# Patient Record
Sex: FEMALE | Race: WHITE | Employment: OTHER | URBAN - NONMETROPOLITAN AREA
[De-identification: names, ages, dates, MRNs, and addresses within clinical notes are randomized per-mention and may not be internally consistent; named-entity substitution may affect disease eponyms.]

---

## 2017-09-06 ENCOUNTER — HOSPITAL ENCOUNTER (OUTPATIENT)
Dept: WOMENS IMAGING | Age: 62
Discharge: HOME | End: 2017-09-06
Payer: COMMERCIAL

## 2017-09-06 DIAGNOSIS — Z12.31 VISIT FOR SCREENING MAMMOGRAM: ICD-10-CM

## 2017-09-06 PROCEDURE — 77063 BREAST TOMOSYNTHESIS BI: CPT

## 2019-04-16 ENCOUNTER — HOSPITAL ENCOUNTER (EMERGENCY)
Age: 64
Discharge: HOME | End: 2019-04-17
Payer: COMMERCIAL

## 2019-04-16 ENCOUNTER — APPOINTMENT (OUTPATIENT)
Dept: GENERAL RADIOLOGY | Age: 64
End: 2019-04-16
Payer: COMMERCIAL

## 2019-04-16 DIAGNOSIS — M77.51 BONE SPUR OF RIGHT FOOT: ICD-10-CM

## 2019-04-16 DIAGNOSIS — S93.601A SPRAIN OF RIGHT FOOT, INITIAL ENCOUNTER: Primary | ICD-10-CM

## 2019-04-16 PROCEDURE — 73600 X-RAY EXAM OF ANKLE: CPT

## 2019-04-16 PROCEDURE — 73630 X-RAY EXAM OF FOOT: CPT

## 2019-04-16 PROCEDURE — 99283 EMERGENCY DEPT VISIT LOW MDM: CPT

## 2019-04-16 RX ORDER — ACETAMINOPHEN 325 MG/1
650 TABLET ORAL ONCE
Status: COMPLETED | OUTPATIENT
Start: 2019-04-16 | End: 2019-04-17

## 2019-04-16 ASSESSMENT — ENCOUNTER SYMPTOMS
RHINORRHEA: 0
WHEEZING: 0
VOMITING: 0
BACK PAIN: 0
EYE DISCHARGE: 0
NAUSEA: 0
SORE THROAT: 0
SHORTNESS OF BREATH: 0
COUGH: 0
ABDOMINAL PAIN: 0
EYE PAIN: 0
DIARRHEA: 0

## 2019-04-16 ASSESSMENT — PAIN DESCRIPTION - PAIN TYPE: TYPE: ACUTE PAIN

## 2019-04-16 ASSESSMENT — PAIN DESCRIPTION - LOCATION: LOCATION: FOOT

## 2019-04-16 ASSESSMENT — PAIN DESCRIPTION - DESCRIPTORS: DESCRIPTORS: ACHING

## 2019-04-16 ASSESSMENT — PAIN DESCRIPTION - ORIENTATION: ORIENTATION: RIGHT

## 2019-04-16 ASSESSMENT — PAIN SCALES - GENERAL: PAINLEVEL_OUTOF10: 4

## 2019-04-17 VITALS
SYSTOLIC BLOOD PRESSURE: 143 MMHG | HEIGHT: 61 IN | OXYGEN SATURATION: 98 % | BODY MASS INDEX: 19.45 KG/M2 | RESPIRATION RATE: 14 BRPM | WEIGHT: 103 LBS | TEMPERATURE: 98 F | HEART RATE: 88 BPM | DIASTOLIC BLOOD PRESSURE: 83 MMHG

## 2019-04-17 PROCEDURE — 6370000000 HC RX 637 (ALT 250 FOR IP): Performed by: STUDENT IN AN ORGANIZED HEALTH CARE EDUCATION/TRAINING PROGRAM

## 2019-04-17 RX ADMIN — ACETAMINOPHEN 650 MG: 325 TABLET ORAL at 00:04

## 2019-04-17 NOTE — ED PROVIDER NOTES
history of Chronic kidney disease. SURGICAL HISTORY      has a past surgical history that includes Breast surgery; Tubal ligation (); fracture surgery; eye surgery; Cholecystectomy; bladder suspension (); and Colonoscopy. CURRENT MEDICATIONS       Discharge Medication List as of 2019 12:14 AM      CONTINUE these medications which have NOT CHANGED    Details   alendronate (FOSAMAX) 70 MG tablet Take 70 mg by mouth every 7 daysHistorical Med      Calcium Citrate-Vitamin D (CALCIUM + D PO) Take by mouthHistorical Med      MULTIPLE VITAMIN PO Take  by mouth daily. Historical Med      Omega-3 Fatty Acids (OMEGA 3 PO) Take 1 tablet by mouth daily. Historical Med             ALLERGIES     has No Known Allergies. FAMILY HISTORY     indicated that her mother is alive. She indicated that her father is . family history includes Arrhythmia in her mother; Heart Disease in her father. SOCIAL HISTORY      reports that she has never smoked. She has never used smokeless tobacco. She reports that she does not drink alcohol or use drugs. PHYSICAL EXAM     INITIAL VITALS:  height is 5' 1\" (1.549 m) and weight is 103 lb (46.7 kg). Her oral temperature is 98 °F (36.7 °C). Her blood pressure is 143/83 (abnormal) and her pulse is 88. Her respiration is 14 and oxygen saturation is 98%. Physical Exam   Constitutional: She is oriented to person, place, and time. She appears well-developed and well-nourished. HENT:   Head: Normocephalic and atraumatic. Right Ear: External ear normal.   Left Ear: External ear normal.   Eyes: Conjunctivae are normal. Right eye exhibits no discharge. Left eye exhibits no discharge. No scleral icterus. Neck: Normal range of motion. Neck supple. No JVD present. Cardiovascular: Normal rate and regular rhythm. Pulmonary/Chest: Effort normal. No stridor. No respiratory distress. Abdominal: Soft. She exhibits no distension. Musculoskeletal: Normal range of motion. She exhibits no edema. Right foot: There is tenderness (to the proximal forefoot) and swelling (to the proximal forefoot). Pain to the right foot with lateral squeezing. Neurological: She is alert and oriented to person, place, and time. She exhibits normal muscle tone. GCS eye subscore is 4. GCS verbal subscore is 5. GCS motor subscore is 6. Skin: Skin is warm and dry. She is not diaphoretic. No erythema. Psychiatric: She has a normal mood and affect. Her behavior is normal.   Nursing note and vitals reviewed. DIFFERENTIAL DIAGNOSIS:   Including but not limited to Fracture, Sprain, Dislocation    DIAGNOSTIC RESULTS     EKG: All EKG's are interpreted by the Emergency Department Physician who either signs or Co-signs this chart in the absence of a cardiologist.    RADIOLOGY: non-plain film images(s) such as CT, Ultrasound andMRI are read by the radiologist.  Plain radiographic images are visualized and preliminarily interpreted by the emergency physician unless otherwise stated below. XR FOOT RIGHT (MIN 3 VIEWS)   Final Result    No acute bone or joint abnormality. Talonavicular joint DJD. Tiny plantar calcaneal spur. Hypertrophic spur arising from the lateral sesamoid adjacent to the first metatarsal head. **This report has been created using voice recognition software. It may contain minor errors which are inherent in voice recognition technology. **      Final report electronically signed by Dr. Stephany Varela on 4/17/2019 12:05 AM      XR ANKLE RIGHT (2 VIEWS)   Final Result    No acute fracture or dislocation. Mild anterior and posterior soft tissue swelling. Moderate ankle joint DJD. **This report has been created using voice recognition software. It may contain minor errors which are inherent in voice recognition technology. **      Final report electronically signed by Dr. Stephany Varela on 4/16/2019 10:45 PM          LABS:   Labs Reviewed - No data to display    EMERGENCY DEPARTMENT COURSE:   Vitals:    Vitals:    04/16/19 2142 04/16/19 2143 04/17/19 0004   BP:  (!) 180/88 (!) 143/83   Pulse: 88     Resp: 14     Temp: 98 °F (36.7 °C)     TempSrc: Oral     SpO2: 98%     Weight: 103 lb (46.7 kg)     Height: 5' 1\" (1.549 m)       2144 Xray of ankle ordered    2311 Weight bearing xray ordered. Tylenol given    X-rays reveal calcaneal spur degenerative joint disease at talonavicular joint and hypertrophic spur at the lateral sesamoid adjacent to the first metatarsal head. No acute bone or joint abnormality is found. provided an Ace wrap and has her own crutches. I have given the patient strict written and verbal instructions about care at home, follow-up, and sign and symptoms of worsening of condition and they did verbalize understanding. CRITICAL CARE:   None    CONSULTS:  None    PROCEDURES:  None    FINALIMPRESSION      1. Sprain of right foot, initial encounter    2. Bone spur of right foot          DISPOSITION/PLAN   discharge    PATIENT REFERRED TO:  Rico Restrepo  [id number]  [phone]    Go in 1 week  if symptoms continue, for follow up      DISCHARGE MEDICATIONS:  Discharge Medication List as of 4/17/2019 12:14 AM          (Please note that portions of this notewere completed with a voice recognition program.  Efforts were made to edit the dictations but occasionally words are mis-transcribed.)    Scribe:  Signed by: Nataliia Vyas, 4/16/19 11:11 PM Scribing for and in the presence of Tatianna Vincent PA-C. Provider:  I personally performed the services described in the documentation, reviewed and editedthe documentation which was dictated to the scribe in my presence, and it accurately records my words and actions.     RASHEED ParhamC4/16/19 12:26 AM        CRYSTAL Marks PA-C  04/17/19 [id number]

## 2019-04-17 NOTE — ED TRIAGE NOTES
Pt presents to the ED with c/o right foot pain following a fall at home. Pt reports she was rushing to go outside and get the mail when she tripped and fell forward hitting the top of her right foot. Pt presents to the ED using crutches from home. Some swelling noted to the top of foot.

## 2019-05-03 ENCOUNTER — HOSPITAL ENCOUNTER (OUTPATIENT)
Dept: WOMENS IMAGING | Age: 64
Discharge: HOME | End: 2019-05-03
Payer: MEDICARE

## 2019-05-03 DIAGNOSIS — Z78.0 POST-MENOPAUSAL: ICD-10-CM

## 2019-05-03 DIAGNOSIS — Z12.31 VISIT FOR SCREENING MAMMOGRAM: ICD-10-CM

## 2019-05-03 PROCEDURE — 77063 BREAST TOMOSYNTHESIS BI: CPT

## 2019-05-03 PROCEDURE — 77080 DXA BONE DENSITY AXIAL: CPT

## 2019-09-03 LAB
BILIRUBIN URINE: ABNORMAL MG/DL
BLOOD, URINE: ABNORMAL
CLARITY: ABNORMAL
COLOR: YELLOW
GLUCOSE URINE: NEGATIVE
KETONES, URINE: NEGATIVE
LEUKOCYTE ESTERASE, URINE: ABNORMAL
NITRITE, URINE: NEGATIVE
PH UA: 7 (ref 4.5–8)
PROTEIN UA: NEGATIVE
SPECIFIC GRAVITY UA: 1.01 (ref 1–1.03)
UROBILINOGEN, URINE: NORMAL

## 2019-09-23 LAB
BILIRUBIN URINE: NORMAL MG/DL
BLOOD, URINE: NORMAL
CLARITY: CLEAR
COLOR: YELLOW
GLUCOSE URINE: NEGATIVE
KETONES, URINE: NEGATIVE
LEUKOCYTE ESTERASE, URINE: NORMAL
NITRITE, URINE: NEGATIVE
PH UA: 6 (ref 4.5–8)
PROTEIN UA: NEGATIVE
SPECIFIC GRAVITY UA: 1.02 (ref 1–1.03)
UROBILINOGEN, URINE: NORMAL

## 2019-10-03 LAB
BILIRUBIN URINE: NORMAL MG/DL
BLOOD, URINE: NORMAL
CLARITY: CLEAR
COLOR: YELLOW
GLUCOSE URINE: NEGATIVE
KETONES, URINE: NEGATIVE
LEUKOCYTE ESTERASE, URINE: NORMAL
NITRITE, URINE: NEGATIVE
PH UA: 6.5 (ref 4.5–8)
PROTEIN UA: NEGATIVE
SPECIFIC GRAVITY UA: 1.01 (ref 1–1.03)
UROBILINOGEN, URINE: NORMAL

## 2019-10-17 LAB
BILIRUBIN URINE: NORMAL MG/DL
BLOOD, URINE: NORMAL
CLARITY: CLEAR
COLOR: YELLOW
GLUCOSE URINE: NEGATIVE
KETONES, URINE: NEGATIVE
LEUKOCYTE ESTERASE, URINE: NEGATIVE
NITRITE, URINE: NEGATIVE
PH UA: 6.5 (ref 4.5–8)
PROTEIN UA: NEGATIVE
SPECIFIC GRAVITY UA: 1.02 (ref 1–1.03)
UROBILINOGEN, URINE: NORMAL

## 2019-10-29 ENCOUNTER — HOSPITAL ENCOUNTER (OUTPATIENT)
Dept: CT IMAGING | Age: 64
Discharge: HOME | End: 2019-10-29
Payer: MEDICARE

## 2019-10-29 DIAGNOSIS — R31.21 ASYMPTOMATIC MICROSCOPIC HEMATURIA: ICD-10-CM

## 2019-10-29 LAB — POC CREATININE WHOLE BLOOD: 0.8 MG/DL (ref 0.5–1.2)

## 2019-10-29 PROCEDURE — 6360000004 HC RX CONTRAST MEDICATION: Performed by: FAMILY MEDICINE

## 2019-10-29 PROCEDURE — 74178 CT ABD&PLV WO CNTR FLWD CNTR: CPT

## 2019-10-29 PROCEDURE — 82565 ASSAY OF CREATININE: CPT

## 2019-10-29 RX ADMIN — IOPAMIDOL 80 ML: 755 INJECTION, SOLUTION INTRAVENOUS at 16:34

## 2019-11-04 ENCOUNTER — OFFICE VISIT (OUTPATIENT)
Dept: UROLOGY | Age: 64
End: 2019-11-04
Payer: MEDICARE

## 2019-11-04 VITALS
HEIGHT: 60 IN | SYSTOLIC BLOOD PRESSURE: 120 MMHG | DIASTOLIC BLOOD PRESSURE: 78 MMHG | WEIGHT: 103.8 LBS | BODY MASS INDEX: 20.38 KG/M2

## 2019-11-04 DIAGNOSIS — R31.9 HEMATURIA, UNSPECIFIED TYPE: Primary | ICD-10-CM

## 2019-11-04 LAB
BILIRUBIN URINE: NEGATIVE
BLOOD URINE, POC: ABNORMAL
CHARACTER, URINE: CLEAR
COLOR, URINE: YELLOW
GLUCOSE URINE: NEGATIVE MG/DL
KETONES, URINE: NEGATIVE
LEUKOCYTE CLUMPS, URINE: ABNORMAL
NITRITE, URINE: NEGATIVE
PH, URINE: 5.5 (ref 5–9)
PROTEIN, URINE: NEGATIVE MG/DL
SPECIFIC GRAVITY, URINE: 1.02 (ref 1–1.03)
UROBILINOGEN, URINE: 0.2 EU/DL (ref 0–1)

## 2019-11-04 PROCEDURE — 81003 URINALYSIS AUTO W/O SCOPE: CPT | Performed by: UROLOGY

## 2019-11-04 PROCEDURE — 99204 OFFICE O/P NEW MOD 45 MIN: CPT | Performed by: UROLOGY

## 2019-11-11 ENCOUNTER — TELEPHONE (OUTPATIENT)
Dept: UROLOGY | Age: 64
End: 2019-11-11

## 2019-11-11 ENCOUNTER — PROCEDURE VISIT (OUTPATIENT)
Dept: UROLOGY | Age: 64
End: 2019-11-11
Payer: MEDICARE

## 2019-11-11 VITALS
BODY MASS INDEX: 20.62 KG/M2 | WEIGHT: 105 LBS | DIASTOLIC BLOOD PRESSURE: 80 MMHG | SYSTOLIC BLOOD PRESSURE: 110 MMHG | HEIGHT: 60 IN

## 2019-11-11 DIAGNOSIS — Z01.818 PRE-OP TESTING: ICD-10-CM

## 2019-11-11 DIAGNOSIS — R31.9 HEMATURIA, UNSPECIFIED TYPE: Primary | ICD-10-CM

## 2019-11-11 LAB
BILIRUBIN URINE: NEGATIVE
BLOOD URINE, POC: ABNORMAL
CHARACTER, URINE: CLEAR
COLOR, URINE: YELLOW
GLUCOSE URINE: NEGATIVE MG/DL
KETONES, URINE: NEGATIVE
LEUKOCYTE CLUMPS, URINE: NEGATIVE
NITRITE, URINE: NEGATIVE
PH, URINE: 5.5 (ref 5–9)
PROTEIN, URINE: NEGATIVE MG/DL
SPECIFIC GRAVITY, URINE: 1.02 (ref 1–1.03)
UROBILINOGEN, URINE: 0.2 EU/DL (ref 0–1)

## 2019-11-11 PROCEDURE — 52000 CYSTOURETHROSCOPY: CPT | Performed by: UROLOGY

## 2019-11-11 PROCEDURE — 81003 URINALYSIS AUTO W/O SCOPE: CPT | Performed by: UROLOGY

## 2019-11-11 PROCEDURE — 99214 OFFICE O/P EST MOD 30 MIN: CPT | Performed by: UROLOGY

## 2019-11-13 ENCOUNTER — PREP FOR PROCEDURE (OUTPATIENT)
Dept: UROLOGY | Age: 64
End: 2019-11-13

## 2019-11-13 ENCOUNTER — TELEPHONE (OUTPATIENT)
Dept: UROLOGY | Age: 64
End: 2019-11-13

## 2019-11-13 ENCOUNTER — OFFICE VISIT (OUTPATIENT)
Dept: RHEUMATOLOGY | Age: 64
End: 2019-11-13
Payer: MEDICARE

## 2019-11-13 VITALS
BODY MASS INDEX: 20.6 KG/M2 | OXYGEN SATURATION: 96 % | DIASTOLIC BLOOD PRESSURE: 60 MMHG | HEIGHT: 60 IN | WEIGHT: 104.94 LBS | HEART RATE: 73 BPM | SYSTOLIC BLOOD PRESSURE: 80 MMHG

## 2019-11-13 DIAGNOSIS — M81.0 AGE-RELATED OSTEOPOROSIS WITHOUT CURRENT PATHOLOGICAL FRACTURE: Primary | ICD-10-CM

## 2019-11-13 DIAGNOSIS — R53.83 OTHER FATIGUE: ICD-10-CM

## 2019-11-13 PROCEDURE — 99204 OFFICE O/P NEW MOD 45 MIN: CPT | Performed by: NURSE PRACTITIONER

## 2019-11-13 RX ORDER — SODIUM CHLORIDE 9 MG/ML
INJECTION, SOLUTION INTRAVENOUS CONTINUOUS
Status: CANCELLED | OUTPATIENT
Start: 2019-12-02

## 2019-11-13 ASSESSMENT — ENCOUNTER SYMPTOMS
EYE PAIN: 0
CONSTIPATION: 0
COUGH: 0
EYE REDNESS: 0
DIARRHEA: 0
TROUBLE SWALLOWING: 0
BACK PAIN: 0
SHORTNESS OF BREATH: 0
VOMITING: 0

## 2019-11-19 ENCOUNTER — HOSPITAL ENCOUNTER (OUTPATIENT)
Dept: PHYSICAL THERAPY | Age: 64
Setting detail: THERAPIES SERIES
Discharge: HOME | End: 2019-11-19
Payer: MEDICARE

## 2019-11-19 PROCEDURE — 97110 THERAPEUTIC EXERCISES: CPT

## 2019-11-19 PROCEDURE — 97530 THERAPEUTIC ACTIVITIES: CPT

## 2019-11-19 PROCEDURE — 97161 PT EVAL LOW COMPLEX 20 MIN: CPT

## 2019-11-19 ASSESSMENT — PAIN DESCRIPTION - PAIN TYPE: TYPE: CHRONIC PAIN

## 2019-11-19 ASSESSMENT — PAIN SCALES - GENERAL: PAINLEVEL_OUTOF10: 2

## 2019-11-19 ASSESSMENT — PAIN DESCRIPTION - LOCATION: LOCATION: BACK

## 2019-11-20 ENCOUNTER — HOSPITAL ENCOUNTER (OUTPATIENT)
Age: 64
Discharge: HOME | End: 2019-11-20
Payer: MEDICARE

## 2019-11-20 ENCOUNTER — HOSPITAL ENCOUNTER (OUTPATIENT)
Dept: GENERAL RADIOLOGY | Age: 64
Discharge: HOME | End: 2019-11-20
Payer: MEDICARE

## 2019-11-20 DIAGNOSIS — Z01.818 PRE-OP TESTING: ICD-10-CM

## 2019-11-20 DIAGNOSIS — M81.0 AGE-RELATED OSTEOPOROSIS WITHOUT CURRENT PATHOLOGICAL FRACTURE: ICD-10-CM

## 2019-11-20 DIAGNOSIS — R53.83 OTHER FATIGUE: ICD-10-CM

## 2019-11-20 DIAGNOSIS — R31.9 HEMATURIA, UNSPECIFIED TYPE: ICD-10-CM

## 2019-11-20 LAB
ALBUMIN SERPL-MCNC: 4.4 G/DL (ref 3.5–5.1)
ALP BLD-CCNC: 62 U/L (ref 38–126)
ALT SERPL-CCNC: 12 U/L (ref 11–66)
ANION GAP SERPL CALCULATED.3IONS-SCNC: 11 MEQ/L (ref 8–16)
AST SERPL-CCNC: 21 U/L (ref 5–40)
BASOPHILS # BLD: 0.9 %
BASOPHILS ABSOLUTE: 0 THOU/MM3 (ref 0–0.1)
BILIRUB SERPL-MCNC: 0.6 MG/DL (ref 0.3–1.2)
BUN BLDV-MCNC: 19 MG/DL (ref 7–22)
C-REACTIVE PROTEIN: 0.1 MG/DL (ref 0–1)
CALCIUM SERPL-MCNC: 9.6 MG/DL (ref 8.5–10.5)
CHLORIDE BLD-SCNC: 100 MEQ/L (ref 98–111)
CO2: 28 MEQ/L (ref 23–33)
CREAT SERPL-MCNC: 0.7 MG/DL (ref 0.4–1.2)
EKG ATRIAL RATE: 57 BPM
EKG P AXIS: 43 DEGREES
EKG P-R INTERVAL: 132 MS
EKG Q-T INTERVAL: 422 MS
EKG QRS DURATION: 78 MS
EKG QTC CALCULATION (BAZETT): 410 MS
EKG R AXIS: 45 DEGREES
EKG T AXIS: 45 DEGREES
EKG VENTRICULAR RATE: 57 BPM
EOSINOPHIL # BLD: 6 %
EOSINOPHILS ABSOLUTE: 0.3 THOU/MM3 (ref 0–0.4)
ERYTHROCYTE [DISTWIDTH] IN BLOOD BY AUTOMATED COUNT: 13.1 % (ref 11.5–14.5)
ERYTHROCYTE [DISTWIDTH] IN BLOOD BY AUTOMATED COUNT: 44.5 FL (ref 35–45)
GFR SERPL CREATININE-BSD FRML MDRD: 84 ML/MIN/1.73M2
GLUCOSE BLD-MCNC: 91 MG/DL (ref 70–108)
HCT VFR BLD CALC: 42.6 % (ref 37–47)
HEMOGLOBIN: 14.2 GM/DL (ref 12–16)
IMMATURE GRANS (ABS): 0.01 THOU/MM3 (ref 0–0.07)
IMMATURE GRANULOCYTES: 0.2 %
LYMPHOCYTES # BLD: 39.7 %
LYMPHOCYTES ABSOLUTE: 1.8 THOU/MM3 (ref 1–4.8)
MAGNESIUM: 1.9 MG/DL (ref 1.6–2.4)
MCH RBC QN AUTO: 30.9 PG (ref 26–33)
MCHC RBC AUTO-ENTMCNC: 33.3 GM/DL (ref 32.2–35.5)
MCV RBC AUTO: 92.6 FL (ref 81–99)
MONOCYTES # BLD: 9.7 %
MONOCYTES ABSOLUTE: 0.4 THOU/MM3 (ref 0.4–1.3)
NUCLEATED RED BLOOD CELLS: 0 /100 WBC
PHOSPHORUS: 3.7 MG/DL (ref 2.4–4.7)
PLATELET # BLD: 277 THOU/MM3 (ref 130–400)
PMV BLD AUTO: 10.7 FL (ref 9.4–12.4)
POTASSIUM SERPL-SCNC: 4.8 MEQ/L (ref 3.5–5.2)
RBC # BLD: 4.6 MILL/MM3 (ref 4.2–5.4)
SEDIMENTATION RATE, ERYTHROCYTE: 20 MM/HR (ref 0–20)
SEG NEUTROPHILS: 43.5 %
SEGMENTED NEUTROPHILS ABSOLUTE COUNT: 2 THOU/MM3 (ref 1.8–7.7)
SODIUM BLD-SCNC: 139 MEQ/L (ref 135–145)
T4 FREE: 1.18 NG/DL (ref 0.93–1.76)
TOTAL PROTEIN: 7.3 G/DL (ref 6.1–8)
TSH SERPL DL<=0.05 MIU/L-ACNC: 5.46 UIU/ML (ref 0.4–4.2)
VITAMIN D 25-HYDROXY: 38 NG/ML (ref 30–100)
WBC # BLD: 4.6 THOU/MM3 (ref 4.8–10.8)

## 2019-11-20 PROCEDURE — 36415 COLL VENOUS BLD VENIPUNCTURE: CPT

## 2019-11-20 PROCEDURE — 93005 ELECTROCARDIOGRAM TRACING: CPT

## 2019-11-20 PROCEDURE — 84100 ASSAY OF PHOSPHORUS: CPT

## 2019-11-20 PROCEDURE — 84443 ASSAY THYROID STIM HORMONE: CPT

## 2019-11-20 PROCEDURE — 85025 COMPLETE CBC W/AUTO DIFF WBC: CPT

## 2019-11-20 PROCEDURE — 85651 RBC SED RATE NONAUTOMATED: CPT

## 2019-11-20 PROCEDURE — 83735 ASSAY OF MAGNESIUM: CPT

## 2019-11-20 PROCEDURE — 86140 C-REACTIVE PROTEIN: CPT

## 2019-11-20 PROCEDURE — 71046 X-RAY EXAM CHEST 2 VIEWS: CPT

## 2019-11-20 PROCEDURE — 82306 VITAMIN D 25 HYDROXY: CPT

## 2019-11-20 PROCEDURE — 84439 ASSAY OF FREE THYROXINE: CPT

## 2019-11-20 PROCEDURE — 80053 COMPREHEN METABOLIC PANEL: CPT

## 2019-11-27 DIAGNOSIS — M81.0 AGE-RELATED OSTEOPOROSIS WITHOUT CURRENT PATHOLOGICAL FRACTURE: ICD-10-CM

## 2019-11-27 RX ORDER — SODIUM CHLORIDE 9 MG/ML
INJECTION, SOLUTION INTRAVENOUS CONTINUOUS
Status: CANCELLED | OUTPATIENT
Start: 2019-11-27

## 2019-11-27 RX ORDER — EPINEPHRINE 1 MG/ML
0.3 INJECTION, SOLUTION, CONCENTRATE INTRAVENOUS PRN
Status: CANCELLED | OUTPATIENT
Start: 2019-11-27

## 2019-11-27 RX ORDER — METHYLPREDNISOLONE SODIUM SUCCINATE 125 MG/2ML
125 INJECTION, POWDER, LYOPHILIZED, FOR SOLUTION INTRAMUSCULAR; INTRAVENOUS ONCE
Status: CANCELLED | OUTPATIENT
Start: 2019-11-27

## 2019-11-27 RX ORDER — DIPHENHYDRAMINE HYDROCHLORIDE 50 MG/ML
50 INJECTION INTRAMUSCULAR; INTRAVENOUS ONCE
Status: CANCELLED | OUTPATIENT
Start: 2019-11-27

## 2019-12-02 ENCOUNTER — ANESTHESIA (OUTPATIENT)
Dept: OPERATING ROOM | Age: 64
End: 2019-12-02
Payer: MEDICARE

## 2019-12-02 ENCOUNTER — ANESTHESIA EVENT (OUTPATIENT)
Dept: OPERATING ROOM | Age: 64
End: 2019-12-02
Payer: MEDICARE

## 2019-12-02 ENCOUNTER — HOSPITAL ENCOUNTER (OUTPATIENT)
Age: 64
Setting detail: OUTPATIENT SURGERY
Discharge: HOME | End: 2019-12-02
Attending: UROLOGY | Admitting: UROLOGY
Payer: MEDICARE

## 2019-12-02 ENCOUNTER — TELEPHONE (OUTPATIENT)
Dept: UROLOGY | Age: 64
End: 2019-12-02

## 2019-12-02 VITALS — OXYGEN SATURATION: 97 % | DIASTOLIC BLOOD PRESSURE: 59 MMHG | SYSTOLIC BLOOD PRESSURE: 125 MMHG

## 2019-12-02 VITALS
TEMPERATURE: 98 F | HEIGHT: 60 IN | BODY MASS INDEX: 20.38 KG/M2 | HEART RATE: 66 BPM | WEIGHT: 103.8 LBS | SYSTOLIC BLOOD PRESSURE: 131 MMHG | OXYGEN SATURATION: 100 % | DIASTOLIC BLOOD PRESSURE: 63 MMHG | RESPIRATION RATE: 16 BRPM

## 2019-12-02 PROCEDURE — 2580000003 HC RX 258

## 2019-12-02 PROCEDURE — 3700000000 HC ANESTHESIA ATTENDED CARE: Performed by: UROLOGY

## 2019-12-02 PROCEDURE — 3700000001 HC ADD 15 MINUTES (ANESTHESIA): Performed by: UROLOGY

## 2019-12-02 PROCEDURE — 7100000011 HC PHASE II RECOVERY - ADDTL 15 MIN: Performed by: UROLOGY

## 2019-12-02 PROCEDURE — 3600000013 HC SURGERY LEVEL 3 ADDTL 15MIN: Performed by: UROLOGY

## 2019-12-02 PROCEDURE — 6360000002 HC RX W HCPCS

## 2019-12-02 PROCEDURE — 6360000002 HC RX W HCPCS: Performed by: NURSE ANESTHETIST, CERTIFIED REGISTERED

## 2019-12-02 PROCEDURE — 7100000010 HC PHASE II RECOVERY - FIRST 15 MIN: Performed by: UROLOGY

## 2019-12-02 PROCEDURE — 88305 TISSUE EXAM BY PATHOLOGIST: CPT

## 2019-12-02 PROCEDURE — 2709999900 HC NON-CHARGEABLE SUPPLY: Performed by: UROLOGY

## 2019-12-02 PROCEDURE — 3600000003 HC SURGERY LEVEL 3 BASE: Performed by: UROLOGY

## 2019-12-02 RX ORDER — FENTANYL CITRATE 50 UG/ML
INJECTION, SOLUTION INTRAMUSCULAR; INTRAVENOUS PRN
Status: DISCONTINUED | OUTPATIENT
Start: 2019-12-02 | End: 2019-12-02 | Stop reason: SDUPTHER

## 2019-12-02 RX ORDER — PROPOFOL 10 MG/ML
INJECTION, EMULSION INTRAVENOUS PRN
Status: DISCONTINUED | OUTPATIENT
Start: 2019-12-02 | End: 2019-12-02 | Stop reason: SDUPTHER

## 2019-12-02 RX ORDER — FENTANYL CITRATE 50 UG/ML
50 INJECTION, SOLUTION INTRAMUSCULAR; INTRAVENOUS EVERY 5 MIN PRN
Status: DISCONTINUED | OUTPATIENT
Start: 2019-12-02 | End: 2019-12-02 | Stop reason: HOSPADM

## 2019-12-02 RX ORDER — LABETALOL 20 MG/4 ML (5 MG/ML) INTRAVENOUS SYRINGE
10 EVERY 10 MIN PRN
Status: DISCONTINUED | OUTPATIENT
Start: 2019-12-02 | End: 2019-12-02 | Stop reason: HOSPADM

## 2019-12-02 RX ORDER — MORPHINE SULFATE 2 MG/ML
2 INJECTION, SOLUTION INTRAMUSCULAR; INTRAVENOUS EVERY 5 MIN PRN
Status: DISCONTINUED | OUTPATIENT
Start: 2019-12-02 | End: 2019-12-02 | Stop reason: HOSPADM

## 2019-12-02 RX ORDER — SODIUM CHLORIDE 9 MG/ML
INJECTION, SOLUTION INTRAVENOUS CONTINUOUS
Status: DISCONTINUED | OUTPATIENT
Start: 2019-12-02 | End: 2019-12-02 | Stop reason: HOSPADM

## 2019-12-02 RX ADMIN — PROPOFOL 10 MG: 10 INJECTION, EMULSION INTRAVENOUS at 15:15

## 2019-12-02 RX ADMIN — PROPOFOL 20 MG: 10 INJECTION, EMULSION INTRAVENOUS at 15:17

## 2019-12-02 RX ADMIN — FENTANYL CITRATE 50 MCG: 50 INJECTION INTRAMUSCULAR; INTRAVENOUS at 15:23

## 2019-12-02 RX ADMIN — Medication 2 G: at 15:01

## 2019-12-02 RX ADMIN — PROPOFOL 30 MG: 10 INJECTION, EMULSION INTRAVENOUS at 15:05

## 2019-12-02 RX ADMIN — PROPOFOL 50 MG: 10 INJECTION, EMULSION INTRAVENOUS at 15:01

## 2019-12-02 RX ADMIN — FENTANYL CITRATE 50 MCG: 50 INJECTION INTRAMUSCULAR; INTRAVENOUS at 15:00

## 2019-12-02 RX ADMIN — SODIUM CHLORIDE: 9 INJECTION, SOLUTION INTRAVENOUS at 12:56

## 2019-12-02 RX ADMIN — PROPOFOL 20 MG: 10 INJECTION, EMULSION INTRAVENOUS at 15:10

## 2019-12-02 ASSESSMENT — PULMONARY FUNCTION TESTS
PIF_VALUE: 1
PIF_VALUE: 0
PIF_VALUE: 1
PIF_VALUE: 0
PIF_VALUE: 2
PIF_VALUE: 0

## 2019-12-02 ASSESSMENT — PAIN SCALES - GENERAL
PAINLEVEL_OUTOF10: 0

## 2019-12-02 ASSESSMENT — PAIN - FUNCTIONAL ASSESSMENT: PAIN_FUNCTIONAL_ASSESSMENT: 0-10

## 2019-12-03 ENCOUNTER — HOSPITAL ENCOUNTER (OUTPATIENT)
Dept: PHYSICAL THERAPY | Age: 64
Setting detail: THERAPIES SERIES
End: 2019-12-03
Payer: MEDICARE

## 2019-12-09 ENCOUNTER — HOSPITAL ENCOUNTER (OUTPATIENT)
Dept: NURSING | Age: 64
Discharge: HOME | End: 2019-12-09
Payer: MEDICARE

## 2019-12-09 VITALS
RESPIRATION RATE: 16 BRPM | WEIGHT: 105 LBS | HEART RATE: 60 BPM | TEMPERATURE: 97.9 F | BODY MASS INDEX: 20.62 KG/M2 | DIASTOLIC BLOOD PRESSURE: 60 MMHG | OXYGEN SATURATION: 99 % | HEIGHT: 60 IN | SYSTOLIC BLOOD PRESSURE: 126 MMHG

## 2019-12-09 DIAGNOSIS — M81.0 AGE-RELATED OSTEOPOROSIS WITHOUT CURRENT PATHOLOGICAL FRACTURE: Primary | ICD-10-CM

## 2019-12-09 PROCEDURE — 96372 THER/PROPH/DIAG INJ SC/IM: CPT

## 2019-12-09 PROCEDURE — 6360000002 HC RX W HCPCS: Performed by: NURSE PRACTITIONER

## 2019-12-09 RX ORDER — METHYLPREDNISOLONE SODIUM SUCCINATE 125 MG/2ML
125 INJECTION, POWDER, LYOPHILIZED, FOR SOLUTION INTRAMUSCULAR; INTRAVENOUS ONCE
Status: CANCELLED | OUTPATIENT
Start: 2020-06-08

## 2019-12-09 RX ORDER — SODIUM CHLORIDE 9 MG/ML
INJECTION, SOLUTION INTRAVENOUS CONTINUOUS
Status: CANCELLED | OUTPATIENT
Start: 2020-06-08

## 2019-12-09 RX ORDER — DIPHENHYDRAMINE HYDROCHLORIDE 50 MG/ML
50 INJECTION INTRAMUSCULAR; INTRAVENOUS ONCE
Status: CANCELLED | OUTPATIENT
Start: 2020-06-08

## 2019-12-09 RX ADMIN — DENOSUMAB 60 MG: 60 INJECTION SUBCUTANEOUS at 10:42

## 2019-12-09 NOTE — PROGRESS NOTES
__m__ Safety:       (Environmental)   [place] to environment   Ensure ID band is correct and in place/ allergy band as needed   Assess for fall risk   Initiate fall precautions as applicable (fall band, side rails, etc.)   Call light within reach   Bed in low position/ wheels locked

## 2019-12-10 ENCOUNTER — HOSPITAL ENCOUNTER (OUTPATIENT)
Dept: PHYSICAL THERAPY | Age: 64
Setting detail: THERAPIES SERIES
Discharge: HOME | End: 2019-12-10
Payer: MEDICARE

## 2019-12-10 PROCEDURE — 97110 THERAPEUTIC EXERCISES: CPT

## 2019-12-10 ASSESSMENT — PAIN SCALES - GENERAL: PAINLEVEL_OUTOF10: 0

## 2019-12-16 ENCOUNTER — OFFICE VISIT (OUTPATIENT)
Dept: UROLOGY | Age: 64
End: 2019-12-16
Payer: MEDICARE

## 2019-12-16 VITALS
WEIGHT: 105.9 LBS | BODY MASS INDEX: 20.79 KG/M2 | SYSTOLIC BLOOD PRESSURE: 102 MMHG | HEIGHT: 60 IN | DIASTOLIC BLOOD PRESSURE: 60 MMHG

## 2019-12-16 DIAGNOSIS — R31.21 ASYMPTOMATIC MICROSCOPIC HEMATURIA: ICD-10-CM

## 2019-12-16 DIAGNOSIS — N32.9 LESION OF BLADDER: Primary | ICD-10-CM

## 2019-12-16 PROCEDURE — 99213 OFFICE O/P EST LOW 20 MIN: CPT | Performed by: UROLOGY

## 2019-12-16 PROCEDURE — 81003 URINALYSIS AUTO W/O SCOPE: CPT | Performed by: UROLOGY

## 2019-12-17 ENCOUNTER — HOSPITAL ENCOUNTER (OUTPATIENT)
Dept: PHYSICAL THERAPY | Age: 64
Setting detail: THERAPIES SERIES
Discharge: HOME | End: 2019-12-17
Payer: MEDICARE

## 2019-12-17 PROCEDURE — 97110 THERAPEUTIC EXERCISES: CPT

## 2019-12-17 ASSESSMENT — PAIN SCALES - GENERAL: PAINLEVEL_OUTOF10: 0

## 2019-12-23 ENCOUNTER — HOSPITAL ENCOUNTER (OUTPATIENT)
Dept: PHYSICAL THERAPY | Age: 64
Setting detail: THERAPIES SERIES
Discharge: HOME | End: 2019-12-23
Payer: MEDICARE

## 2019-12-23 PROCEDURE — 97110 THERAPEUTIC EXERCISES: CPT

## 2020-01-11 ENCOUNTER — TELEPHONE (OUTPATIENT)
Dept: UROLOGY | Age: 65
End: 2020-01-11

## 2020-01-11 NOTE — TELEPHONE ENCOUNTER
Patient to have repeat CBC with differential obtained due to mildly low white blood cell count on November 2019 labs.

## 2020-01-13 NOTE — TELEPHONE ENCOUNTER
The patient was advised to have the labs repeated. She is in [place] and will return the call to the office with a name of a  lab and fax number of her choice.

## 2020-01-15 LAB
BASOPHILS ABSOLUTE: NORMAL
BASOPHILS RELATIVE PERCENT: 0.6 %
EOSINOPHILS ABSOLUTE: 0.1 /ΜL
EOSINOPHILS RELATIVE PERCENT: 1.9 %
HCT VFR BLD CALC: 40.6 % (ref 36–46)
HEMOGLOBIN: 13.8 G/DL (ref 12–16)
LYMPHOCYTES ABSOLUTE: 1.4 /ΜL
LYMPHOCYTES RELATIVE PERCENT: 27.9 %
MCH RBC QN AUTO: 31.7 PG
MCHC RBC AUTO-ENTMCNC: 33.9 G/DL
MCV RBC AUTO: 93.6 FL
MONOCYTES ABSOLUTE: 0.3 /ΜL
MONOCYTES RELATIVE PERCENT: 5.2 %
NEUTROPHILS ABSOLUTE: 3.2 /ΜL
NEUTROPHILS RELATIVE PERCENT: 64.4 %
PDW BLD-RTO: 13.2 %
PLATELET # BLD: 251 K/ΜL
PMV BLD AUTO: 9.6 FL
RBC # BLD: 4.34 10^6/ΜL
WBC # BLD: 4.9 10^3/ML

## 2020-01-15 NOTE — TELEPHONE ENCOUNTER
Patient returned call and stated to fax order to [facility] [phone]. Order faxed.

## 2020-01-15 NOTE — TELEPHONE ENCOUNTER
Attempted to call the patient to get information on the lab of her choice or a mailing address where she is staying in [place]. Voicemail left to return the call to the office.

## 2020-05-13 ENCOUNTER — VIRTUAL VISIT (OUTPATIENT)
Dept: RHEUMATOLOGY | Age: 65
End: 2020-05-13
Payer: MEDICARE

## 2020-05-13 PROCEDURE — 99213 OFFICE O/P EST LOW 20 MIN: CPT | Performed by: NURSE PRACTITIONER

## 2020-05-13 ASSESSMENT — ENCOUNTER SYMPTOMS
COUGH: 0
BACK PAIN: 0
EYE PAIN: 0
SHORTNESS OF BREATH: 0
DIARRHEA: 0
EYE REDNESS: 0
CONSTIPATION: 0
TROUBLE SWALLOWING: 0
VOMITING: 0

## 2020-06-10 ENCOUNTER — HOSPITAL ENCOUNTER (OUTPATIENT)
Dept: NURSING | Age: 65
Discharge: HOME | End: 2020-06-10
Payer: MEDICARE

## 2020-06-10 VITALS
RESPIRATION RATE: 18 BRPM | SYSTOLIC BLOOD PRESSURE: 122 MMHG | DIASTOLIC BLOOD PRESSURE: 59 MMHG | HEART RATE: 61 BPM | TEMPERATURE: 97 F

## 2020-06-10 DIAGNOSIS — M81.0 AGE-RELATED OSTEOPOROSIS WITHOUT CURRENT PATHOLOGICAL FRACTURE: Primary | ICD-10-CM

## 2020-06-10 PROCEDURE — 6360000002 HC RX W HCPCS: Performed by: NURSE PRACTITIONER

## 2020-06-10 PROCEDURE — 96372 THER/PROPH/DIAG INJ SC/IM: CPT

## 2020-06-10 RX ORDER — SODIUM CHLORIDE 9 MG/ML
INJECTION, SOLUTION INTRAVENOUS CONTINUOUS
Status: CANCELLED | OUTPATIENT
Start: 2020-12-09

## 2020-06-10 RX ORDER — DIPHENHYDRAMINE HYDROCHLORIDE 50 MG/ML
50 INJECTION INTRAMUSCULAR; INTRAVENOUS ONCE
Status: CANCELLED | OUTPATIENT
Start: 2020-12-09

## 2020-06-10 RX ORDER — METHYLPREDNISOLONE SODIUM SUCCINATE 125 MG/2ML
125 INJECTION, POWDER, LYOPHILIZED, FOR SOLUTION INTRAMUSCULAR; INTRAVENOUS ONCE
Status: CANCELLED | OUTPATIENT
Start: 2020-12-09

## 2020-06-10 RX ADMIN — DENOSUMAB 60 MG: 60 INJECTION SUBCUTANEOUS at 11:02

## 2020-06-10 ASSESSMENT — PAIN - FUNCTIONAL ASSESSMENT: PAIN_FUNCTIONAL_ASSESSMENT: 0-10

## 2020-06-10 NOTE — PROGRESS NOTES
1100:  ARRIVES AMBULATORY FOR PROLIA INJECTION. PROCESS REVIEWED AND PT RIGHTS AND RESPONSIBILITIES OFFERED TO PT.  1110:  TOLERATED INJECTION WELL AND PT DISCHARGED AMBULATORY WITH INSTRUCTIONS.   PT IS AWARE SHE NEEDS NEW ORDER.      _M___ Safety:       (Environmental)   [place] to environment   Ensure ID band is correct and in place/ allergy band as needed   Assess for fall risk   Initiate fall precautions as applicable (fall band, side rails, etc.)   Call light within reach   Bed in low position/ wheels locked    _M__ Pain:        Assess pain level and characteristics   Administer analgesics as ordered   Assess effectiveness of pain management and report to MD as needed    _M___ Knowledge Deficit:   Assess baseline knowledge   Provide teaching at level of understanding   Provide teaching via preferred learning method   Evaluate teaching effectiveness    __M__ Hemodynamic/Respiratory Status:       (Pre and Post Procedure Monitoring)   Assess/Monitor vital signs and LOC   Assess Baseline SpO2 prior to any sedation   Obtain weight/height   Assess vital signs/ LOC until patient meets discharge criteria   Monitor procedure site and notify MD of any issues

## 2020-06-22 ENCOUNTER — OFFICE VISIT (OUTPATIENT)
Dept: UROLOGY | Age: 65
End: 2020-06-22
Payer: MEDICARE

## 2020-06-22 VITALS — WEIGHT: 104 LBS | HEIGHT: 60 IN | TEMPERATURE: 97.7 F | BODY MASS INDEX: 20.42 KG/M2

## 2020-06-22 LAB
BILIRUBIN URINE: NEGATIVE
BLOOD URINE, POC: ABNORMAL
CHARACTER, URINE: CLEAR
COLOR, URINE: YELLOW
GLUCOSE URINE: NEGATIVE MG/DL
KETONES, URINE: NEGATIVE
LEUKOCYTE CLUMPS, URINE: NEGATIVE
NITRITE, URINE: NEGATIVE
PH, URINE: 5.5 (ref 5–9)
PROTEIN, URINE: NEGATIVE MG/DL
SPECIFIC GRAVITY, URINE: >= 1.03 (ref 1–1.03)
UROBILINOGEN, URINE: 0.2 EU/DL (ref 0–1)

## 2020-06-22 PROCEDURE — 81003 URINALYSIS AUTO W/O SCOPE: CPT | Performed by: UROLOGY

## 2020-06-22 PROCEDURE — 99213 OFFICE O/P EST LOW 20 MIN: CPT | Performed by: UROLOGY

## 2020-12-01 ENCOUNTER — TELEPHONE (OUTPATIENT)
Dept: RHEUMATOLOGY | Age: 65
End: 2020-12-01

## 2020-12-01 RX ORDER — DIPHENHYDRAMINE HYDROCHLORIDE 50 MG/ML
50 INJECTION INTRAMUSCULAR; INTRAVENOUS ONCE
Status: CANCELLED | OUTPATIENT
Start: 2020-12-01

## 2020-12-01 RX ORDER — EPINEPHRINE 1 MG/ML
0.3 INJECTION, SOLUTION, CONCENTRATE INTRAVENOUS PRN
Status: CANCELLED | OUTPATIENT
Start: 2020-12-01

## 2020-12-01 RX ORDER — SODIUM CHLORIDE 9 MG/ML
INJECTION, SOLUTION INTRAVENOUS CONTINUOUS
Status: CANCELLED | OUTPATIENT
Start: 2020-12-01

## 2020-12-01 RX ORDER — METHYLPREDNISOLONE SODIUM SUCCINATE 125 MG/2ML
125 INJECTION, POWDER, LYOPHILIZED, FOR SOLUTION INTRAMUSCULAR; INTRAVENOUS ONCE
Status: CANCELLED | OUTPATIENT
Start: 2020-12-01

## 2020-12-09 NOTE — TELEPHONE ENCOUNTER
Patient called office wanting to know how to schedule Prolia. Looked in chart- prolia auth pending. Reached out to Birdie Marshall to look into the authorization.

## 2020-12-10 NOTE — TELEPHONE ENCOUNTER
Prolia authorized with insurance. Patient called and detailed VM left with central scheduling phone number. Patient to call to schedule.

## 2020-12-11 ENCOUNTER — HOSPITAL ENCOUNTER (OUTPATIENT)
Dept: NURSING | Age: 65
Discharge: HOME | End: 2020-12-11
Payer: MEDICARE

## 2020-12-11 VITALS
DIASTOLIC BLOOD PRESSURE: 62 MMHG | HEART RATE: 68 BPM | SYSTOLIC BLOOD PRESSURE: 130 MMHG | RESPIRATION RATE: 20 BRPM | TEMPERATURE: 98 F

## 2020-12-11 DIAGNOSIS — M81.0 AGE-RELATED OSTEOPOROSIS WITHOUT CURRENT PATHOLOGICAL FRACTURE: Primary | ICD-10-CM

## 2020-12-11 PROCEDURE — 96372 THER/PROPH/DIAG INJ SC/IM: CPT

## 2020-12-11 PROCEDURE — 6360000002 HC RX W HCPCS: Performed by: NURSE PRACTITIONER

## 2020-12-11 RX ORDER — METHYLPREDNISOLONE SODIUM SUCCINATE 125 MG/2ML
125 INJECTION, POWDER, LYOPHILIZED, FOR SOLUTION INTRAMUSCULAR; INTRAVENOUS ONCE
Status: CANCELLED | OUTPATIENT
Start: 2021-06-11

## 2020-12-11 RX ORDER — DIPHENHYDRAMINE HYDROCHLORIDE 50 MG/ML
50 INJECTION INTRAMUSCULAR; INTRAVENOUS ONCE
Status: CANCELLED | OUTPATIENT
Start: 2021-06-11

## 2020-12-11 RX ORDER — SODIUM CHLORIDE 9 MG/ML
INJECTION, SOLUTION INTRAVENOUS CONTINUOUS
Status: CANCELLED | OUTPATIENT
Start: 2021-06-11

## 2020-12-11 RX ADMIN — DENOSUMAB 60 MG: 60 INJECTION SUBCUTANEOUS at 10:41

## 2020-12-11 ASSESSMENT — PAIN - FUNCTIONAL ASSESSMENT: PAIN_FUNCTIONAL_ASSESSMENT: 0-10

## 2020-12-11 NOTE — PROGRESS NOTES
1045:  ARRIVES AMBULATORY FOR PROLIA. PROCESS REVIEWED AND PT RIGHTS AND RESPONSIBILITIES OFFERED TO PT.  PT DENIES ANY ISSUES WITH PAST INJECTIONS.   1055:  TOLERATED INJECTION WELL AND PT DISCHARGED AMBULATORY WITH INSTRUCTIONS.        _M___ Safety:       (Environmental)   [place] to environment   Ensure ID band is correct and in place/ allergy band as needed   Assess for fall risk   Initiate fall precautions as applicable (fall band, side rails, etc.)   Call light within reach   Bed in low position/ wheels locked    __M__ Pain:        Assess pain level and characteristics   Administer analgesics as ordered   Assess effectiveness of pain management and report to MD as needed    _M___ Knowledge Deficit:   Assess baseline knowledge   Provide teaching at level of understanding   Provide teaching via preferred learning method   Evaluate teaching effectiveness    _M___ Hemodynamic/Respiratory Status:       (Pre and Post Procedure Monitoring)   Assess/Monitor vital signs and LOC   Assess Baseline SpO2 prior to any sedation   Obtain weight/height   Assess vital signs/ LOC until patient meets discharge criteria   Monitor procedure site and notify MD of any issues

## 2021-06-10 ENCOUNTER — OFFICE VISIT (OUTPATIENT)
Dept: RHEUMATOLOGY | Age: 66
End: 2021-06-10
Payer: MEDICARE

## 2021-06-10 VITALS
BODY MASS INDEX: 20.85 KG/M2 | SYSTOLIC BLOOD PRESSURE: 108 MMHG | WEIGHT: 106.2 LBS | DIASTOLIC BLOOD PRESSURE: 76 MMHG | OXYGEN SATURATION: 97 % | HEART RATE: 57 BPM | HEIGHT: 60 IN

## 2021-06-10 DIAGNOSIS — M81.0 AGE-RELATED OSTEOPOROSIS WITHOUT CURRENT PATHOLOGICAL FRACTURE: Primary | ICD-10-CM

## 2021-06-10 PROCEDURE — 99213 OFFICE O/P EST LOW 20 MIN: CPT | Performed by: NURSE PRACTITIONER

## 2021-06-10 ASSESSMENT — ENCOUNTER SYMPTOMS
CONSTIPATION: 0
TROUBLE SWALLOWING: 0
SHORTNESS OF BREATH: 0
BACK PAIN: 0
NAUSEA: 0
EYE ITCHING: 0
COUGH: 0
DIARRHEA: 0
EYE PAIN: 0
ABDOMINAL PAIN: 0

## 2021-06-10 NOTE — PROGRESS NOTES
[facility]  Bone Fragility Follow up     Visit Date: 6/10/2021  MRN: [id number]  Cc:   Chief Complaint   Patient presents with    1 Year Follow Up       HPI:   Darryl Lombard  is a(n)65 y.o. female here today for follow up of Osteoporosis. Still taking prolia, scheduled for next injection this month. Tolerating well. No falls or fractures. Taking vitamin D supplements, no longer taking calcium due to indigestion. Still exercising. Remains active. ROS:  Review of Systems   Constitutional: Positive for fatigue. Negative for fever and unexpected weight change. HENT: Negative for congestion and trouble swallowing. Eyes: Negative for pain and itching. Respiratory: Negative for cough and shortness of breath. Cardiovascular: Negative for chest pain and leg swelling. Gastrointestinal: Negative for abdominal pain, constipation, diarrhea and nausea. Endocrine: Negative for cold intolerance and heat intolerance. Genitourinary: Negative for difficulty urinating, frequency and urgency. Musculoskeletal: Negative for arthralgias, back pain and joint swelling. Skin: Negative for rash. Neurological: Negative for dizziness, weakness, numbness and headaches. Psychiatric/Behavioral: The patient is not nervous/anxious.           PAST MEDICAL HISTORY  Past Medical History:   Diagnosis Date    Chronic kidney disease     Kidney stone       SOCIAL HISTORY  Social History     Socioeconomic History    Marital status:      Spouse name: None    Number of children: None    Years of education: None    Highest education level: None   Occupational History    Occupation: RN on [employer]     Employer: [employer]   Tobacco Use    Smoking status: Never Smoker    Smokeless tobacco: Never Used   Vaping Use    Vaping Use: Never used   Substance and Sexual Activity    Alcohol use: No    Drug use: No    Sexual activity: None   Other Topics Concern    None   Social History Narrative    None     Social Determinants of Health     Financial Resource Strain:     Difficulty of Paying Living Expenses:    Food Insecurity:     Worried About Running Out of Food in the Last Year:     [address] in the Last Year:    Transportation Needs:     Lack of Transportation (Medical):  Lack of Transportation (Non-Medical):    Physical Activity:     Days of Exercise per Week:     Minutes of Exercise per Session:    Stress:     Feeling of Stress :    Social Connections:     Frequency of Communication with Friends and Family:     Frequency of Social Gatherings with Friends and Family:     Attends Voodoo Services:     Active Member of Clubs or Organizations:     Attends Club or Organization Meetings:     Marital Status:    Intimate Partner Violence:     Fear of Current or Ex-Partner:     Emotionally Abused:     Physically Abused:     Sexually Abused:        FAMILY HISTORY  Family History   Problem Relation Age of Onset    Arrhythmia Mother     Osteoporosis Mother     Heart Disease Father        SURGICAL HISTORY  Past Surgical History:   Procedure Laterality Date    BLADDER SUSPENSION  2011   [facility] lump removed    CHOLECYSTECTOMY      2004    COLONOSCOPY      CYSTOSCOPY W BIOPSY OF BLADDER N/A 12/2/2019    CYSTOSCOPY BLADDER BIOPSY W/ FULGURATION performed by Cornelio Marshall MD at [address] [place] [address]      2012- L wrist    TUBAL LIGATION  1983       ALLERGIES  No Known Allergies    CURRENTMEDICATIONS  Current Outpatient Medications   Medication Sig Dispense Refill    CRANBERRY PO Take by mouth      vitamin D (CHOLECALCIFEROL) 1000 UNIT TABS tablet take 1 tablet by mouth once daily  0    MULTIPLE VITAMIN PO Take 0.5 tablets by mouth daily        No current facility-administered medications for this visit.          Objective:  /76 (Site: Left Upper Arm, Position: Sitting, Cuff Size: Medium Adult) Pulse 57   Ht 5' (1.524 m)   Wt 106 lb 3.2 oz (48.2 kg)   SpO2 97%   BMI 20.74 kg/m²     General: No distress. Alert. Eyes: PERRL. No conjunctival injection. ENT: No discharge. Pharynx clear. Neck: Trachea midline. Normal thyroid. Resp: No accessory muscle use. Lung sounds clear. CV: Regular rate. Regularrhythm. No mumur or rub. No edema. GI: Non-tender. Non-distended. M/S:   Upper extremities:  Muscle strength 5/5, FROM, No Synovitis     Lower Extremities:   Muscle strength 5/5, FROM, No Synovitis     Neuro: Oriented to person, place, time. DTR's 2/4 bilat and equal  Psych:  No anxiety or agitation. Skin: Warm and dry. No rash on exposed extremities. Lymph: No cervical LAD. No supraclavicular LAD.        Labs:  CBC  Lab Results   Component Value Date    WBC 4.9 01/15/2020    RBC 4.34 01/15/2020    HGB 13.8 01/15/2020    HCT 40.6 01/15/2020    MCV 93.6 01/15/2020    MCH 31.7 01/15/2020    MCHC 33.9 01/15/2020    RDW 13.2 01/15/2020     01/15/2020       CMP  Lab Results   Component Value Date    CALCIUM 9.6 11/20/2019    LABALBU 4.4 11/20/2019    PROT 7.3 11/20/2019     11/20/2019    K 4.8 11/20/2019    CO2 28 11/20/2019     11/20/2019    BUN 19 11/20/2019    CREATININE 0.7 11/20/2019    ALKPHOS 62 11/20/2019    ALT 12 11/20/2019    AST 21 11/20/2019       HgBA1c: No components found for: HGBA1C    Lab Results   Component Value Date    TSH 5.460 (H) 11/20/2019     Lab Results   Component Value Date    VITD25 38 11/20/2019       Lab Results   Component Value Date    SEDRATE 20 11/20/2019     Lab Results   Component Value Date    CRP 0.10 11/20/2019       Lab Results   Component Value Date    VITD25 38 11/20/2019    CALCIUM 9.6 11/20/2019    MG 1.9 11/20/2019     Lab Results   Component Value Date     11/20/2019    K 4.8 11/20/2019     11/20/2019    CO2 28 11/20/2019    BUN 19 11/20/2019    CREATININE 0.7 11/20/2019    GLUCOSE 91 11/20/2019    CALCIUM 9.6 11/20/2019

## 2021-06-14 ENCOUNTER — HOSPITAL ENCOUNTER (OUTPATIENT)
Dept: NURSING | Age: 66
Discharge: HOME | End: 2021-06-14
Payer: MEDICARE

## 2021-06-14 VITALS
TEMPERATURE: 98.5 F | OXYGEN SATURATION: 98 % | DIASTOLIC BLOOD PRESSURE: 60 MMHG | RESPIRATION RATE: 18 BRPM | SYSTOLIC BLOOD PRESSURE: 119 MMHG | HEART RATE: 64 BPM

## 2021-06-14 DIAGNOSIS — M81.0 AGE-RELATED OSTEOPOROSIS WITHOUT CURRENT PATHOLOGICAL FRACTURE: Primary | ICD-10-CM

## 2021-06-14 PROCEDURE — 6360000002 HC RX W HCPCS: Performed by: NURSE PRACTITIONER

## 2021-06-14 PROCEDURE — 96372 THER/PROPH/DIAG INJ SC/IM: CPT

## 2021-06-14 RX ORDER — METHYLPREDNISOLONE SODIUM SUCCINATE 125 MG/2ML
125 INJECTION, POWDER, LYOPHILIZED, FOR SOLUTION INTRAMUSCULAR; INTRAVENOUS ONCE
Status: CANCELLED | OUTPATIENT
Start: 2021-12-06 | End: 2021-12-06

## 2021-06-14 RX ORDER — DIPHENHYDRAMINE HYDROCHLORIDE 50 MG/ML
50 INJECTION INTRAMUSCULAR; INTRAVENOUS ONCE
Status: CANCELLED | OUTPATIENT
Start: 2021-12-06 | End: 2021-12-06

## 2021-06-14 RX ORDER — SODIUM CHLORIDE 9 MG/ML
INJECTION, SOLUTION INTRAVENOUS CONTINUOUS
Status: CANCELLED | OUTPATIENT
Start: 2021-12-06

## 2021-06-14 RX ADMIN — DENOSUMAB 60 MG: 60 INJECTION SUBCUTANEOUS at 14:01

## 2021-06-14 NOTE — PROGRESS NOTES
1355 pt arrives ambulatory for prolia injection. Injection explained and questions answered. PT RIGHTS AND RESPONSIBILITIES OFFERED TO PT.  1401 injection given. Pt tolerated it well with no complaints. Pt discharged ambulatory with instructions with no complaints.              __m__ Safety:       (Environmental)   [place] to environment   Ensure ID band is correct and in place/ allergy band as needed   Assess for fall risk   Initiate fall precautions as applicable (fall band, side rails, etc.)   Call light within reach   Bed in low position/ wheels locked    _m___ Pain:        Assess pain level and characteristics   Administer analgesics as ordered   Assess effectiveness of pain management and report to MD as needed    __m__ Knowledge Deficit:   Assess baseline knowledge   Provide teaching at level of understanding   Provide teaching via preferred learning method   Evaluate teaching effectiveness    _m___ Hemodynamic/Respiratory Status:       (Pre and Post Procedure Monitoring)   Assess/Monitor vital signs and LOC   Assess Baseline SpO2 prior to any sedation   Obtain weight/height   Assess vital signs/ LOC until patient meets discharge criteria   Monitor procedure site and notify MD of any issues

## 2021-07-07 ENCOUNTER — HOSPITAL ENCOUNTER (OUTPATIENT)
Dept: WOMENS IMAGING | Age: 66
Discharge: HOME | End: 2021-07-07
Payer: MEDICARE

## 2021-07-07 DIAGNOSIS — Z12.31 VISIT FOR SCREENING MAMMOGRAM: ICD-10-CM

## 2021-07-07 DIAGNOSIS — M81.0 AGE-RELATED OSTEOPOROSIS WITHOUT CURRENT PATHOLOGICAL FRACTURE: ICD-10-CM

## 2021-07-07 PROCEDURE — 77080 DXA BONE DENSITY AXIAL: CPT

## 2021-07-07 PROCEDURE — 77063 BREAST TOMOSYNTHESIS BI: CPT

## 2021-09-02 ENCOUNTER — HOSPITAL ENCOUNTER (EMERGENCY)
Age: 66
Discharge: HOME | End: 2021-09-02
Payer: MEDICARE

## 2021-09-02 VITALS
BODY MASS INDEX: 20.42 KG/M2 | HEART RATE: 65 BPM | SYSTOLIC BLOOD PRESSURE: 142 MMHG | TEMPERATURE: 98.5 F | OXYGEN SATURATION: 97 % | RESPIRATION RATE: 16 BRPM | DIASTOLIC BLOOD PRESSURE: 68 MMHG | HEIGHT: 60 IN | WEIGHT: 104 LBS

## 2021-09-02 DIAGNOSIS — H10.9 CONJUNCTIVITIS OF LEFT EYE, UNSPECIFIED CONJUNCTIVITIS TYPE: Primary | ICD-10-CM

## 2021-09-02 PROCEDURE — 99213 OFFICE O/P EST LOW 20 MIN: CPT

## 2021-09-02 PROCEDURE — 99213 OFFICE O/P EST LOW 20 MIN: CPT | Performed by: EMERGENCY MEDICINE

## 2021-09-02 RX ORDER — POLYMYXIN B SULFATE AND TRIMETHOPRIM 1; 10000 MG/ML; [USP'U]/ML
1 SOLUTION OPHTHALMIC EVERY 4 HOURS
Qty: 1 EACH | Refills: 0 | Status: SHIPPED | OUTPATIENT
Start: 2021-09-02 | End: 2021-09-12

## 2021-09-02 ASSESSMENT — PAIN DESCRIPTION - ORIENTATION: ORIENTATION: LEFT

## 2021-09-02 ASSESSMENT — ENCOUNTER SYMPTOMS
SINUS PRESSURE: 0
EYE PAIN: 1
COUGH: 0
RHINORRHEA: 0
EYE DISCHARGE: 1
COLOR CHANGE: 0
EYE REDNESS: 1
PHOTOPHOBIA: 0
SINUS PAIN: 0
EYE ITCHING: 0

## 2021-09-02 ASSESSMENT — PAIN DESCRIPTION - PROGRESSION: CLINICAL_PROGRESSION: NOT CHANGED

## 2021-09-02 ASSESSMENT — PAIN SCALES - GENERAL: PAINLEVEL_OUTOF10: 2

## 2021-09-02 ASSESSMENT — PAIN DESCRIPTION - PAIN TYPE: TYPE: ACUTE PAIN

## 2021-09-02 ASSESSMENT — VISUAL ACUITY: OU: 1

## 2021-09-02 ASSESSMENT — PAIN DESCRIPTION - DESCRIPTORS: DESCRIPTORS: ACHING

## 2021-09-02 ASSESSMENT — PAIN DESCRIPTION - FREQUENCY: FREQUENCY: CONTINUOUS

## 2021-09-02 ASSESSMENT — PAIN DESCRIPTION - LOCATION: LOCATION: EYE

## 2021-09-02 ASSESSMENT — PAIN DESCRIPTION - ONSET: ONSET: SUDDEN

## 2021-09-02 NOTE — ED TRIAGE NOTES
To room with c/o left eye redness, swelling of lid, and frequent tearing. Photophobia. She denies injury, she does not wear contacts currently. She is due to have cateract removed Wednesday.

## 2021-09-02 NOTE — ED PROVIDER NOTES
[facility]       CHIEF COMPLAINT       Chief Complaint   Patient presents with    Eye Problem       Nurses Notes reviewed and I agree except as noted in the HPI. HISTORY OF PRESENT ILLNESS   Liudmila Torrez is a 74 y.o. female who presents for complaints of left eye pain, redness, tearing. Patient states symptoms been present for 12 to 18 hours. Patient states the eye irritated her last evening and became increasingly painful and red through the night. No visual problems. Denies any foreign bodies or trauma/injury to the left eye. No symptoms with her right eye. HPI    REVIEW OF SYSTEMS     Review of Systems   Constitutional: Negative for diaphoresis, fatigue and fever. HENT: Negative for rhinorrhea, sinus pressure and sinus pain. Eyes: Positive for pain, discharge and redness. Negative for photophobia, itching and visual disturbance. Respiratory: Negative for cough. Skin: Negative for color change. Neurological: Negative for dizziness and light-headedness. Psychiatric/Behavioral: Negative for behavioral problems. PAST MEDICAL HISTORY         Diagnosis Date    Chronic kidney disease     Kidney stone       SURGICALHISTORY     Patient  has a past surgical history that includes Breast surgery; Tubal ligation (1983); fracture surgery; eye surgery; Cholecystectomy; bladder suspension (2011); Colonoscopy; and cystoscopy w biopsy of bladder (N/A, 12/2/2019). CURRENT MEDICATIONS       Discharge Medication List as of 9/2/2021  9:01 AM      CONTINUE these medications which have NOT CHANGED    Details   CRANBERRY PO Take by mouthHistorical Med      vitamin D (CHOLECALCIFEROL) 1000 UNIT TABS tablet take 1 tablet by mouth once daily, R-0Historical Med      MULTIPLE VITAMIN PO Take 0.5 tablets by mouth daily Historical Med             ALLERGIES     Patient is has No Known Allergies.     Patients   There is no immunization history on file for this patient. FAMILY HISTORY     Patient's family history includes Arrhythmia in her mother; Breast Cancer (age of onset: 46) in her paternal cousin; Breast Cancer (age of onset: 54) in her paternal cousin; Breast Cancer (age of onset: 62) in her paternal aunt; Heart Disease in her father; Osteoporosis in her mother. SOCIAL HISTORY     Patient  reports that she has never smoked. She has never used smokeless tobacco. She reports that she does not drink alcohol and does not use drugs. PHYSICAL EXAM     ED TRIAGE VITALS  BP: (!) 142/68, Temp: 98.5 °F (36.9 °C), Pulse: 65, Resp: 16, SpO2: 97 %,Estimated body mass index is 20.31 kg/m² as calculated from the following:    Height as of this encounter: 5' (1.524 m). Weight as of this encounter: 104 lb (47.2 kg). ,No LMP recorded. Patient is postmenopausal.    Physical Exam  Constitutional:       General: She is not in acute distress. Appearance: She is normal weight. She is not ill-appearing. HENT:      Head: Normocephalic. Mouth/Throat:      Mouth: Mucous membranes are moist.   Eyes:      General: Lids are normal. Vision grossly intact. No allergic shiner, visual field deficit or scleral icterus. Right eye: No foreign body, discharge or hordeolum. Left eye: No foreign body, discharge or hordeolum. Conjunctiva/sclera:      Left eye: Left conjunctiva is injected. No chemosis, exudate or hemorrhage. Cardiovascular:      Rate and Rhythm: Normal rate. Pulses: Normal pulses. Pulmonary:      Effort: Pulmonary effort is normal.   Skin:     General: Skin is warm. Capillary Refill: Capillary refill takes less than 2 seconds. Neurological:      General: No focal deficit present. Mental Status: She is alert. Psychiatric:         Mood and Affect: Mood normal.         Behavior: Behavior normal.         DIAGNOSTIC RESULTS     Labs:No results found for this visit on 09/02/21.     IMAGING:    No orders to display EKG:      URGENT CARE COURSE:     Vitals:    09/02/21 0838 09/02/21 0905   BP: (!) 142/68    Pulse: 65    Resp: 16 16   Temp: 98.5 °F (36.9 °C)    TempSrc: Temporal    SpO2:  97%   Weight: 104 lb (47.2 kg)    Height: 5' (1.524 m)        Medications - No data to display         PROCEDURES:  None    FINAL IMPRESSION      1. Conjunctivitis of left eye, unspecified conjunctivitis type          DISPOSITION/ PLAN     Patient presents for what is likely bacterial versus viral conjunctivitis. Will place patient on Polytrim ophthalmic drops. Advised good handwashing. Return if no improvement in 24 to 48 hours.   Sooner if worse      PATIENT REFERRED TO:  [facility]  [address] [facility] / [place] [address]      DISCHARGE MEDICATIONS:  Discharge Medication List as of 9/2/2021  9:01 AM      START taking these medications    Details   trimethoprim-polymyxin b (POLYTRIM) 99317-4.1 UNIT/ML-% ophthalmic solution Place 1 drop into the left eye every 4 hours for 10 days, Disp-1 each, R-0Normal             Discharge Medication List as of 9/2/2021  9:01 AM          Discharge Medication List as of 9/2/2021  9:01 AM          ULYSSES Altamirano CNP    (Please note that portions of this note were completed with a voice recognition program. Efforts were made to edit the dictations but occasionally words are mis-transcribed.)          ULYSSES Altamirano CNP  09/02/21 5018

## 2021-10-19 ENCOUNTER — TELEPHONE (OUTPATIENT)
Dept: RHEUMATOLOGY | Age: 66
End: 2021-10-19

## 2021-10-19 RX ORDER — METHYLPREDNISOLONE SODIUM SUCCINATE 125 MG/2ML
125 INJECTION, POWDER, LYOPHILIZED, FOR SOLUTION INTRAMUSCULAR; INTRAVENOUS ONCE
Status: CANCELLED | OUTPATIENT
Start: 2021-10-19 | End: 2021-10-19

## 2021-10-19 RX ORDER — SODIUM CHLORIDE 9 MG/ML
INJECTION, SOLUTION INTRAVENOUS CONTINUOUS
Status: CANCELLED | OUTPATIENT
Start: 2021-10-19

## 2021-10-19 RX ORDER — DIPHENHYDRAMINE HYDROCHLORIDE 50 MG/ML
50 INJECTION INTRAMUSCULAR; INTRAVENOUS ONCE
Status: CANCELLED | OUTPATIENT
Start: 2021-10-19 | End: 2021-10-19

## 2021-10-19 RX ORDER — EPINEPHRINE 1 MG/ML
0.3 INJECTION, SOLUTION, CONCENTRATE INTRAVENOUS PRN
Status: CANCELLED | OUTPATIENT
Start: 2021-10-19

## 2021-10-19 NOTE — TELEPHONE ENCOUNTER
Pt called asking if another order needed placed and Auth for her to get prolia in  December this year.

## 2021-11-29 ENCOUNTER — APPOINTMENT (OUTPATIENT)
Dept: GENERAL RADIOLOGY | Age: 66
End: 2021-11-29
Payer: MEDICARE

## 2021-11-29 ENCOUNTER — HOSPITAL ENCOUNTER (EMERGENCY)
Age: 66
Discharge: HOME | End: 2021-11-29
Payer: MEDICARE

## 2021-11-29 VITALS
BODY MASS INDEX: 20.42 KG/M2 | RESPIRATION RATE: 18 BRPM | SYSTOLIC BLOOD PRESSURE: 138 MMHG | HEART RATE: 62 BPM | WEIGHT: 104 LBS | DIASTOLIC BLOOD PRESSURE: 67 MMHG | OXYGEN SATURATION: 98 % | TEMPERATURE: 98.2 F | HEIGHT: 60 IN

## 2021-11-29 DIAGNOSIS — S93.401A MODERATE RIGHT ANKLE SPRAIN, INITIAL ENCOUNTER: Primary | ICD-10-CM

## 2021-11-29 PROCEDURE — 99213 OFFICE O/P EST LOW 20 MIN: CPT

## 2021-11-29 PROCEDURE — 99213 OFFICE O/P EST LOW 20 MIN: CPT | Performed by: NURSE PRACTITIONER

## 2021-11-29 PROCEDURE — 73610 X-RAY EXAM OF ANKLE: CPT

## 2021-11-29 RX ORDER — CALCIUM CARBONATE 260MG(650)
TABLET,CHEWABLE ORAL
COMMUNITY

## 2021-11-29 ASSESSMENT — PAIN DESCRIPTION - ORIENTATION: ORIENTATION: RIGHT

## 2021-11-29 ASSESSMENT — PAIN DESCRIPTION - PROGRESSION: CLINICAL_PROGRESSION: NOT CHANGED

## 2021-11-29 ASSESSMENT — PAIN DESCRIPTION - LOCATION: LOCATION: ANKLE

## 2021-11-29 ASSESSMENT — PAIN DESCRIPTION - DESCRIPTORS: DESCRIPTORS: ACHING

## 2021-11-29 ASSESSMENT — PAIN DESCRIPTION - ONSET: ONSET: ON-GOING

## 2021-11-29 ASSESSMENT — PAIN DESCRIPTION - PAIN TYPE: TYPE: ACUTE PAIN

## 2021-11-29 ASSESSMENT — PAIN SCALES - GENERAL: PAINLEVEL_OUTOF10: 4

## 2021-11-29 ASSESSMENT — PAIN DESCRIPTION - FREQUENCY: FREQUENCY: CONTINUOUS

## 2021-11-29 ASSESSMENT — ENCOUNTER SYMPTOMS: COLOR CHANGE: 0

## 2021-11-29 NOTE — ED PROVIDER NOTES
[facility]       CHIEF COMPLAINT       Chief Complaint   Patient presents with    Ankle Pain       Nurses Notes reviewed and I agree except as noted in the HPI. HISTORY OF PRESENT ILLNESS   Dianne Solano is a 74 y.o. female who presents to the urgent care center complaining of pain to the right ankle after \"missing a step at her daughter's home last night. Patient rates her pain 4 on a 10 scale. Patient did ambulate to the room with a cane did have a slight limp to the gait. See HPI template. The history is provided by the patient. No  was used. Ankle Pain  This is a new problem. The current episode started yesterday. The problem occurs constantly. The problem has not changed since onset. The symptoms are aggravated by walking, exertion and standing. The symptoms are relieved by ice. She has tried rest for the symptoms. The treatment provided mild relief. REVIEW OF SYSTEMS     Review of Systems   Constitutional: Positive for activity change. Musculoskeletal: Positive for arthralgias, gait problem and joint swelling. Right ankle   Skin: Negative for color change. PAST MEDICAL HISTORY         Diagnosis Date    Chronic kidney disease     Kidney stone       SURGICALHISTORY     Patient  has a past surgical history that includes Breast surgery; Tubal ligation (1983); fracture surgery; eye surgery; Cholecystectomy; bladder suspension (2011);  Colonoscopy; cystoscopy w biopsy of bladder (N/A, 12/2/2019); and Cataract removal.    CURRENT MEDICATIONS       Discharge Medication List as of 11/29/2021  2:03 PM      CONTINUE these medications which have NOT CHANGED    Details   Zinc 10 MG LOZG Take by mouthHistorical Med      CRANBERRY PO Take by mouthHistorical Med      vitamin D (CHOLECALCIFEROL) 1000 UNIT TABS tablet take 1 tablet by mouth once daily, R-0Historical Med      MULTIPLE VITAMIN PO Take 0.5 tablets by mouth daily Historical Med             ALLERGIES     Patient is has No Known Allergies. Patients   There is no immunization history on file for this patient. FAMILY HISTORY     Patient's family history includes Arrhythmia in her mother; Breast Cancer (age of onset: 46) in her paternal cousin; Breast Cancer (age of onset: 54) in her paternal cousin; Breast Cancer (age of onset: 62) in her paternal aunt; Heart Disease in her father; Osteoporosis in her mother. SOCIAL HISTORY     Patient  reports that she has never smoked. She has never used smokeless tobacco. She reports that she does not drink alcohol and does not use drugs. PHYSICAL EXAM     ED TRIAGE VITALS  BP: 138/67, Temp: 98.2 °F (36.8 °C), Pulse: 62, Resp: 18, SpO2: 98 %,Estimated body mass index is 20.31 kg/m² as calculated from the following:    Height as of this encounter: 5' (1.524 m). Weight as of this encounter: 104 lb (47.2 kg). ,No LMP recorded. Patient is postmenopausal.    Physical Exam  Vitals and nursing note reviewed. Constitutional:       General: She is not in acute distress. Appearance: Normal appearance. She is well-developed. She is not ill-appearing, toxic-appearing or diaphoretic. HENT:      Head: Normocephalic. Right Ear: External ear normal.      Left Ear: External ear normal.      Nose: Nose normal.   Cardiovascular:      Rate and Rhythm: Normal rate. Pulmonary:      Effort: Pulmonary effort is normal.   Musculoskeletal:         General: Swelling, tenderness and signs of injury present. Cervical back: Full passive range of motion without pain, normal range of motion and neck supple. Right ankle: Swelling present. Tenderness present over the lateral malleolus, medial malleolus, ATF ligament, AITF ligament and CF ligament. No posterior TF ligament tenderness. Right Achilles Tendon: Normal.      Comments: Right ankle   Skin:     General: Skin is warm and dry.       Capillary Refill: Capillary refill takes less than 2 seconds. Neurological:      Mental Status: She is alert and oriented to person, place, and time. Psychiatric:         Behavior: Behavior is cooperative. DIAGNOSTIC RESULTS     Labs:No results found for this visit on 11/29/21. IMAGING:    XR ANKLE RIGHT (MIN 3 VIEWS)   Final Result      No fracture or dislocation. Final report electronically signed by Dr. Pooja Moura on 11/29/2021 1:34 PM            EKG:      URGENT CARE COURSE:     Vitals:    11/29/21 1317   BP: 138/67   Pulse: 62   Resp: 18   Temp: 98.2 °F (36.8 °C)   TempSrc: Temporal   SpO2: 98%   Weight: 104 lb (47.2 kg)   Height: 5' (1.524 m)       Medications - No data to display         PROCEDURES:  None    FINAL IMPRESSION      1. Moderate right ankle sprain, initial encounter          DISPOSITION/ PLAN     Compression with Wrap/Air Cast  Ice, elevation 15-20 minutes 3 times a day for the first 24-48 hours followed with heat 15-20 minutes 3 times a day thereafter. Activity as tolerated.     Take medication as directed  Return for worsening symptoms, otherwise if symptoms persist follow up orthopedics in 1 to 3 days      PATIENT REFERRED TO:  Moe Parikh  [address] [facility] [id number]      DISCHARGE MEDICATIONS:  Discharge Medication List as of 11/29/2021  2:03 PM          Discharge Medication List as of 11/29/2021  2:03 PM          Discharge Medication List as of 11/29/2021  2:03 PM          Jia Rosin, APRN - CNP    (Please note that portions of this note were completed with a voice recognition program. Efforts were made to edit the dictations but occasionally words are mis-transcribed.)           ULYSSES Moreno CNP  11/29/21 6611

## 2021-11-29 NOTE — ED TRIAGE NOTES
To room with c./o right ankle pain after missing a step at daughters home.  She is limping with a cane on arrival.

## 2021-12-14 ENCOUNTER — HOSPITAL ENCOUNTER (OUTPATIENT)
Dept: NURSING | Age: 66
Discharge: HOME | End: 2021-12-14
Payer: MEDICARE

## 2021-12-14 VITALS
TEMPERATURE: 97.7 F | DIASTOLIC BLOOD PRESSURE: 54 MMHG | OXYGEN SATURATION: 99 % | RESPIRATION RATE: 18 BRPM | SYSTOLIC BLOOD PRESSURE: 122 MMHG | HEART RATE: 63 BPM

## 2021-12-14 DIAGNOSIS — M81.0 AGE-RELATED OSTEOPOROSIS WITHOUT CURRENT PATHOLOGICAL FRACTURE: Primary | ICD-10-CM

## 2021-12-14 PROCEDURE — 6360000002 HC RX W HCPCS: Performed by: NURSE PRACTITIONER

## 2021-12-14 PROCEDURE — 96372 THER/PROPH/DIAG INJ SC/IM: CPT

## 2021-12-14 RX ORDER — DIPHENHYDRAMINE HYDROCHLORIDE 50 MG/ML
50 INJECTION INTRAMUSCULAR; INTRAVENOUS ONCE
OUTPATIENT
Start: 2022-06-14 | End: 2022-06-14

## 2021-12-14 RX ORDER — SODIUM CHLORIDE 9 MG/ML
INJECTION, SOLUTION INTRAVENOUS CONTINUOUS
OUTPATIENT
Start: 2022-06-14

## 2021-12-14 RX ORDER — METHYLPREDNISOLONE SODIUM SUCCINATE 125 MG/2ML
125 INJECTION, POWDER, LYOPHILIZED, FOR SOLUTION INTRAMUSCULAR; INTRAVENOUS ONCE
OUTPATIENT
Start: 2022-06-14 | End: 2022-06-14

## 2021-12-14 RX ADMIN — DENOSUMAB 60 MG: 60 INJECTION SUBCUTANEOUS at 09:38

## 2021-12-14 ASSESSMENT — PAIN - FUNCTIONAL ASSESSMENT: PAIN_FUNCTIONAL_ASSESSMENT: 0-10

## 2021-12-14 NOTE — PROGRESS NOTES
5898 Patient arrived to [facility] ambulatory for prolia injection. Oriented to room and call light  PT RIGHTS AND RESPONSIBILITIES OFFERED TO PT.    7103 Injection given and she denies complaints. Discharge instructions given and explained and she denies questions.   Discharged ambulatory     _M___ Safety:       (Environmental)  [place] to environment   Ensure ID band is correct and in place/ allergy band as needed   Assess for fall risk   Initiate fall precautions as applicable (fall band, side rails, etc.)   Call light within reach   Bed in low position/ wheels locked    __M__ Pain:        Assess pain level and characteristics   Administer analgesics as ordered   Assess effectiveness of pain management and report to MD as needed    _M___ Knowledge Deficit:   Assess baseline knowledge   Provide teaching at level of understanding   Provide teaching via preferred learning method   Evaluate teaching effectiveness    __M__ Hemodynamic/Respiratory Status:       (Pre and Post Procedure Monitoring)   Assess/Monitor vital signs and LOC   Assess Baseline SpO2 prior to any sedation   Obtain weight/height   Assess vital signs/ LOC until patient meets discharge criteria   Monitor procedure site and notify MD of any issues

## 2022-06-15 ENCOUNTER — HOSPITAL ENCOUNTER (OUTPATIENT)
Dept: NURSING | Age: 67
Discharge: HOME | End: 2022-06-15
Payer: MEDICARE

## 2022-06-15 VITALS
TEMPERATURE: 97.2 F | SYSTOLIC BLOOD PRESSURE: 122 MMHG | DIASTOLIC BLOOD PRESSURE: 58 MMHG | HEART RATE: 57 BPM | RESPIRATION RATE: 16 BRPM | OXYGEN SATURATION: 98 %

## 2022-06-15 DIAGNOSIS — M81.0 AGE-RELATED OSTEOPOROSIS WITHOUT CURRENT PATHOLOGICAL FRACTURE: Primary | ICD-10-CM

## 2022-06-15 PROCEDURE — 6360000002 HC RX W HCPCS: Performed by: NURSE PRACTITIONER

## 2022-06-15 PROCEDURE — 96372 THER/PROPH/DIAG INJ SC/IM: CPT

## 2022-06-15 RX ORDER — DIPHENHYDRAMINE HYDROCHLORIDE 50 MG/ML
50 INJECTION INTRAMUSCULAR; INTRAVENOUS ONCE
OUTPATIENT
Start: 2022-12-14 | End: 2022-12-14

## 2022-06-15 RX ORDER — METHYLPREDNISOLONE SODIUM SUCCINATE 125 MG/2ML
125 INJECTION, POWDER, LYOPHILIZED, FOR SOLUTION INTRAMUSCULAR; INTRAVENOUS ONCE
OUTPATIENT
Start: 2022-12-14 | End: 2022-12-14

## 2022-06-15 RX ORDER — SODIUM CHLORIDE 9 MG/ML
INJECTION, SOLUTION INTRAVENOUS CONTINUOUS
OUTPATIENT
Start: 2022-12-14

## 2022-06-15 RX ADMIN — DENOSUMAB 60 MG: 60 INJECTION SUBCUTANEOUS at 09:47

## 2022-06-15 ASSESSMENT — PAIN SCALES - GENERAL: PAINLEVEL_OUTOF10: 0

## 2022-06-15 NOTE — PROGRESS NOTES
2204 Patient ambulatory to [facility] for Prolia injection. Patient verbalizes understanding of injection. PT RIGHTS AND RESPONSIBILITIES OFFERED TO PT.    [id number] Injection given to patient. Tolerated well. AVS reviewed with patient. Verbalizes understanding. Patient left ambulatory to discharge lobby.        __M__ Safety:       (Environmental)   [place] to environment   Ensure ID band is correct and in place/ allergy band as needed   Assess for fall risk   Initiate fall precautions as applicable (fall band, side rails, etc.)   Call light within reach   Bed in low position/ wheels locked    _M___ Pain:        Assess pain level and characteristics   Administer analgesics as ordered   Assess effectiveness of pain management and report to MD as needed    _M___ Knowledge Deficit:   Assess baseline knowledge   Provide teaching at level of understanding   Provide teaching via preferred learning method   Evaluate teaching effectiveness    __M__ Hemodynamic/Respiratory Status:       (Pre and Post Procedure Monitoring)   Assess/Monitor vital signs and LOC   Assess Baseline SpO2 prior to any sedation   Obtain weight/height   Assess vital signs/ LOC until patient meets discharge criteria   Monitor procedure site and notify MD of any issues

## 2022-06-23 ENCOUNTER — OFFICE VISIT (OUTPATIENT)
Dept: RHEUMATOLOGY | Age: 67
End: 2022-06-23
Payer: MEDICARE

## 2022-06-23 VITALS
HEART RATE: 57 BPM | BODY MASS INDEX: 20.5 KG/M2 | WEIGHT: 104.4 LBS | HEIGHT: 60 IN | DIASTOLIC BLOOD PRESSURE: 68 MMHG | SYSTOLIC BLOOD PRESSURE: 108 MMHG | OXYGEN SATURATION: 97 %

## 2022-06-23 DIAGNOSIS — M81.0 AGE-RELATED OSTEOPOROSIS WITHOUT CURRENT PATHOLOGICAL FRACTURE: Primary | ICD-10-CM

## 2022-06-23 PROCEDURE — 99213 OFFICE O/P EST LOW 20 MIN: CPT | Performed by: NURSE PRACTITIONER

## 2022-06-23 PROCEDURE — 1123F ACP DISCUSS/DSCN MKR DOCD: CPT | Performed by: NURSE PRACTITIONER

## 2022-06-23 ASSESSMENT — ENCOUNTER SYMPTOMS
SHORTNESS OF BREATH: 0
ABDOMINAL PAIN: 0
TROUBLE SWALLOWING: 0
EYE PAIN: 0
BACK PAIN: 0
NAUSEA: 0
CONSTIPATION: 0
EYE ITCHING: 0
COUGH: 0
DIARRHEA: 0

## 2022-06-23 NOTE — PROGRESS NOTES
\[facility]\""  Bone Fragility Follow up     Visit Date: 6/23/2022  MRN: [id number]  Cc:   Chief Complaint   Patient presents with    1 Year Follow Up       HPI:   Edmond Rojas  is a(n)66 y.o. female here today for follow up of Osteoporosis. Tolerating prolia, last DEXA with improvement. Denies any falls or fractures. ROS:  Review of Systems   Constitutional: Positive for fatigue. Negative for fever and unexpected weight change. HENT: Negative for congestion and trouble swallowing. Eyes: Negative for pain and itching. Respiratory: Negative for cough and shortness of breath. Cardiovascular: Negative for chest pain and leg swelling. Gastrointestinal: Negative for abdominal pain, constipation, diarrhea and nausea. Endocrine: Negative for cold intolerance and heat intolerance. Genitourinary: Negative for difficulty urinating, frequency and urgency. Musculoskeletal: Negative for arthralgias, back pain and joint swelling. Skin: Negative for rash. Neurological: Negative for dizziness, weakness, numbness and headaches. Psychiatric/Behavioral: The patient is not nervous/anxious.           PAST MEDICAL HISTORY  Past Medical History:   Diagnosis Date    Chronic kidney disease     Kidney stone       SOCIAL HISTORY  Social History     Socioeconomic History    Marital status:      Spouse name: None    Number of children: None    Years of education: None    Highest education level: None   Occupational History    Occupation: RN on [employer]     Employer: [employer]. [employer]   Tobacco Use    Smoking status: Never Smoker    Smokeless tobacco: Never Used   Vaping Use    Vaping Use: Never used   Substance and Sexual Activity    Alcohol use: No    Drug use: No    Sexual activity: None   Other Topics Concern    None   Social History Narrative    None     Social Determinants of Health     Financial Resource Strain:     Difficulty of Paying Living Expenses: Not on file Food Insecurity:     Worried About Running Out of Food in the Last Year: Not on file    Cristian of Food in the Last Year: Not on file   Transportation Needs:     Lack of Transportation (Medical): Not on file    Lack of Transportation (Non-Medical):  Not on file   Physical Activity:     Days of Exercise per Week: Not on file    Minutes of Exercise per Session: Not on file   Stress:     Feeling of Stress : Not on file   Social Connections:     Frequency of Communication with Friends and Family: Not on file    Frequency of Social Gatherings with Friends and Family: Not on file    Attends Taoist Services: Not on file    Active Member of [address] [place] [address] [employer] or Organizations: Not on file    Attends Club or Organization Meetings: Not on file    Marital Status: Not on file   Intimate Partner Violence:     Fear of Current or Ex-Partner: Not on file    Emotionally Abused: Not on file    Physically Abused: Not on file    Sexually Abused: Not on file   Housing Stability:     Unable to Pay for Housing in the Last Year: Not on file    Number of [place] in the Last Year: Not on file    Unstable Housing in the Last Year: Not on file       FAMILY HISTORY  Family History   Problem Relation Age of Onset    Arrhythmia Mother     Osteoporosis Mother     Heart Disease Father     Breast Cancer Paternal Aunt 62    Breast Cancer Paternal Cousin 46    Breast Cancer Paternal Cousin 54       SURGICAL HISTORY  Past Surgical History:   Procedure Laterality Date    BLADDER SUSPENSION  2011   [facility] lump removed    CATARACT REMOVAL      CHOLECYSTECTOMY      2004    COLONOSCOPY      CYSTOSCOPY W BIOPSY OF BLADDER N/A 12/2/2019    CYSTOSCOPY BLADDER BIOPSY W/ FULGURATION performed by Kayleen Snyder MD at [facility]   [address]      2012- L wrist    TUBAL LIGATION  1983       ALLERGIES  No Known Allergies    CURRENTMEDICATIONS  Current Outpatient Medications Medication Sig Dispense Refill    CRANBERRY PO Take by mouth      vitamin D (CHOLECALCIFEROL) 1000 UNIT TABS tablet take 1 tablet by mouth once daily  0    MULTIPLE VITAMIN PO Take 0.5 tablets by mouth daily       Zinc 10 MG LOZG Take by mouth (Patient not taking: Reported on 6/23/2022)       No current facility-administered medications for this visit. Objective:  /68 (Site: Left Upper Arm, Position: Sitting, Cuff Size: Medium Adult)   Pulse 57   Ht 5' (1.524 m)   Wt 104 lb 6.4 oz (47.4 kg)   SpO2 97%   BMI 20.39 kg/m²     General: No distress. Alert. Eyes: No conjunctival injection. Resp: No accessory muscle use. Lung sounds clear. CV: Regular rate. Regularrhythm. No mumur or rub. No edema. M/S:   Upper extremities:  Muscle strength 5/5    Lower Extremities:   Muscle strength 5/5    Neuro: Oriented to person, place, time. Psych:  No anxiety or agitation. Skin: Warm and dry. No rash on exposed extremities.        Labs:  CBC  Lab Results   Component Value Date    WBC 4.9 01/15/2020    RBC 4.34 01/15/2020    HGB 13.8 01/15/2020    HCT 40.6 01/15/2020    MCV 93.6 01/15/2020    MCH 31.7 01/15/2020    MCHC 33.9 01/15/2020    RDW 13.2 01/15/2020     01/15/2020       CMP  Lab Results   Component Value Date    CALCIUM 9.6 11/20/2019    LABALBU 4.4 11/20/2019    PROT 7.3 11/20/2019     11/20/2019    K 4.8 11/20/2019    CO2 28 11/20/2019     11/20/2019    BUN 19 11/20/2019    CREATININE 0.7 11/20/2019    ALKPHOS 62 11/20/2019    ALT 12 11/20/2019    AST 21 11/20/2019       HgBA1c: No components found for: HGBA1C    Lab Results   Component Value Date    TSH 5.460 (H) 11/20/2019     Lab Results   Component Value Date    VITD25 38 11/20/2019       Lab Results   Component Value Date    SEDRATE 20 11/20/2019     Lab Results   Component Value Date    CRP 0.10 11/20/2019       Lab Results   Component Value Date    VITD25 38 11/20/2019    CALCIUM 9.6 11/20/2019    MG 1.9 11/20/2019     Lab Results   Component Value Date     11/20/2019    K 4.8 11/20/2019     11/20/2019    CO2 28 11/20/2019    BUN 19 11/20/2019    CREATININE 0.7 11/20/2019    GLUCOSE 91 11/20/2019    CALCIUM 9.6 11/20/2019    PROT 7.3 11/20/2019    LABALBU 4.4 11/20/2019    BILITOT 0.6 11/20/2019    ALKPHOS 62 11/20/2019    AST 21 11/20/2019    ALT 12 11/20/2019         RADIOLOGY:     DEXA 7/7/2021  Left hip: -2.6, 0.56  Right hip: -2, 0.626  Lumbar spine: -1.9, 0.838    DEXA 5/3/2019  Left hip: -2.60  Right hip: -2.20  Lumbar spine: -2.40     DEXA 11/18/2016  Left hip: -2.20  Right hip: -1.90  Lumbar spine: -2.00     DEXA 10/28/2014  Left hip: -2.00  Right hip: -1.50  Lumbar spine: -1.80      Assessment and plan:  Osteoporosis, postmenopausal  - A 75year old  female diagnosed with osteoporosis by recent DEXA on 5/3/2019 with T score -2.60 at left hip. Patient has had several DEXA scans in the past which showed osteopenia. Has history of left wrist and right elbow fracture, falls from standing height. Family history of osteoporosis in her mother with multiple fractures. - prior tx: alendronate (GI upset)   - Prolia 60 mg q 6 months (12/2019)   - calcium and vitamin D supplementation   - Repeat DEXA 7/2021      No follow-ups on file. Electronically signed by ULYSSES Bose CNP on 6/23/2022 at 10:13 AM      Thank you for allowing me to participate in the care of this patient. Please call if there are any questions.

## 2022-12-14 ENCOUNTER — TELEPHONE (OUTPATIENT)
Dept: RHEUMATOLOGY | Age: 67
End: 2022-12-14

## 2022-12-14 DIAGNOSIS — M81.0 AGE-RELATED OSTEOPOROSIS WITHOUT CURRENT PATHOLOGICAL FRACTURE: Primary | ICD-10-CM

## 2022-12-14 RX ORDER — SODIUM CHLORIDE 9 MG/ML
INJECTION, SOLUTION INTRAVENOUS CONTINUOUS
OUTPATIENT
Start: 2022-12-14

## 2022-12-14 RX ORDER — ONDANSETRON 2 MG/ML
8 INJECTION INTRAMUSCULAR; INTRAVENOUS
OUTPATIENT
Start: 2022-12-14

## 2022-12-14 RX ORDER — EPINEPHRINE 1 MG/ML
0.3 INJECTION, SOLUTION, CONCENTRATE INTRAVENOUS PRN
OUTPATIENT
Start: 2022-12-14

## 2022-12-14 RX ORDER — ACETAMINOPHEN 325 MG/1
650 TABLET ORAL
OUTPATIENT
Start: 2022-12-14

## 2022-12-14 RX ORDER — ALBUTEROL SULFATE 90 UG/1
4 AEROSOL, METERED RESPIRATORY (INHALATION) PRN
OUTPATIENT
Start: 2022-12-14

## 2022-12-14 RX ORDER — FAMOTIDINE 10 MG/ML
20 INJECTION, SOLUTION INTRAVENOUS
OUTPATIENT
Start: 2022-12-14

## 2022-12-14 RX ORDER — DIPHENHYDRAMINE HYDROCHLORIDE 50 MG/ML
50 INJECTION INTRAMUSCULAR; INTRAVENOUS
OUTPATIENT
Start: 2022-12-14

## 2022-12-14 NOTE — TELEPHONE ENCOUNTER
Ayanna Villalobos from outpatient  nursing called at stated pt needs a new prolia order.  Please place new order

## 2022-12-16 ENCOUNTER — HOSPITAL ENCOUNTER (OUTPATIENT)
Dept: NURSING | Age: 67
Discharge: HOME | End: 2022-12-16
Payer: MEDICARE

## 2022-12-16 VITALS
SYSTOLIC BLOOD PRESSURE: 134 MMHG | OXYGEN SATURATION: 98 % | HEART RATE: 57 BPM | DIASTOLIC BLOOD PRESSURE: 63 MMHG | TEMPERATURE: 98.2 F

## 2022-12-16 DIAGNOSIS — M81.0 AGE-RELATED OSTEOPOROSIS WITHOUT CURRENT PATHOLOGICAL FRACTURE: Primary | ICD-10-CM

## 2022-12-16 PROCEDURE — 96372 THER/PROPH/DIAG INJ SC/IM: CPT

## 2022-12-16 PROCEDURE — 6360000002 HC RX W HCPCS: Performed by: NURSE PRACTITIONER

## 2022-12-16 RX ORDER — ACETAMINOPHEN 325 MG/1
650 TABLET ORAL
OUTPATIENT
Start: 2023-06-11

## 2022-12-16 RX ORDER — ONDANSETRON 2 MG/ML
8 INJECTION INTRAMUSCULAR; INTRAVENOUS
OUTPATIENT
Start: 2023-06-11

## 2022-12-16 RX ORDER — DIPHENHYDRAMINE HYDROCHLORIDE 50 MG/ML
50 INJECTION INTRAMUSCULAR; INTRAVENOUS
OUTPATIENT
Start: 2023-06-11

## 2022-12-16 RX ORDER — SODIUM CHLORIDE 9 MG/ML
INJECTION, SOLUTION INTRAVENOUS CONTINUOUS
OUTPATIENT
Start: 2023-06-11

## 2022-12-16 RX ORDER — ALBUTEROL SULFATE 90 UG/1
4 AEROSOL, METERED RESPIRATORY (INHALATION) PRN
OUTPATIENT
Start: 2023-06-11

## 2022-12-16 RX ADMIN — DENOSUMAB 60 MG: 60 INJECTION SUBCUTANEOUS at 10:20

## 2022-12-16 ASSESSMENT — PAIN SCALES - GENERAL: PAINLEVEL_OUTOF10: 0

## 2022-12-16 NOTE — PROGRESS NOTES
1000:  arrives ambulatory for prolia injection. Process reviewed. 1028:  tolerated injection well and pt discharged ambulatory with instructions. Pt aware we will need new order from the new physician she will see.       _m___ Safety:       (Environmental)  [place] to environment  Ensure ID band is correct and in place/ allergy band as needed  Assess for fall risk  Initiate fall precautions as applicable (fall band, side rails, etc.)  Call light within reach  Bed in low position/ wheels locked    __m__ Pain:       Assess pain level and characteristics  Administer analgesics as ordered  Assess effectiveness of pain management and report to MD as needed    _m__ Knowledge Deficit:  Assess baseline knowledge  Provide teaching at level of understanding  Provide teaching via preferred learning method  Evaluate teaching effectiveness    __m__ Hemodynamic/Respiratory Status:       (Pre and Post Procedure Monitoring)  Assess/Monitor vital signs and LOC  Assess Baseline SpO2 prior to any sedation  Obtain weight/height  Assess vital signs/ LOC until patient meets discharge criteria  Monitor procedure site and notify MD of any issues    _

## 2022-12-16 NOTE — DISCHARGE INSTRUCTIONS
ACTIVITY:  Continue usual care with your doctor. Call your doctor immediately if any severe problems or go to the nearest emergency room. Will need new order from the new physician you see  I have been treated and hereby acknowledge receiving this instruction sheet.

## 2022-12-29 ENCOUNTER — HOSPITAL ENCOUNTER (OUTPATIENT)
Dept: WOMENS IMAGING | Age: 67
Discharge: HOME | End: 2022-12-29
Payer: MEDICARE

## 2022-12-29 DIAGNOSIS — Z12.31 VISIT FOR SCREENING MAMMOGRAM: ICD-10-CM

## 2022-12-29 PROCEDURE — 77063 BREAST TOMOSYNTHESIS BI: CPT

## 2023-07-08 ENCOUNTER — HOSPITAL ENCOUNTER (EMERGENCY)
Age: 68
Discharge: HOME | End: 2023-07-08
Payer: MEDICARE

## 2023-07-08 VITALS
OXYGEN SATURATION: 100 % | TEMPERATURE: 98 F | HEART RATE: 56 BPM | RESPIRATION RATE: 16 BRPM | DIASTOLIC BLOOD PRESSURE: 88 MMHG | SYSTOLIC BLOOD PRESSURE: 167 MMHG

## 2023-07-08 DIAGNOSIS — L23.7 POISON IVY DERMATITIS: Primary | ICD-10-CM

## 2023-07-08 PROCEDURE — 99213 OFFICE O/P EST LOW 20 MIN: CPT

## 2023-07-08 PROCEDURE — 99213 OFFICE O/P EST LOW 20 MIN: CPT | Performed by: NURSE PRACTITIONER

## 2023-07-08 RX ORDER — DIPHENHYDRAMINE HCL 25 MG
25 TABLET ORAL EVERY 6 HOURS PRN
Qty: 30 TABLET | Refills: 0 | Status: SHIPPED | OUTPATIENT
Start: 2023-07-08 | End: 2023-08-07

## 2023-07-08 RX ORDER — PREDNISONE 10 MG/1
TABLET ORAL
Qty: 18 TABLET | Refills: 0 | Status: SHIPPED | OUTPATIENT
Start: 2023-07-08 | End: 2023-07-17

## 2023-07-08 ASSESSMENT — ENCOUNTER SYMPTOMS: SHORTNESS OF BREATH: 0

## 2023-07-08 NOTE — ED PROVIDER NOTES
[address]  Urgent Care Encounter       CHIEF COMPLAINT       Chief Complaint   Patient presents with    Rash      Onset  wed   was sitting in  tall grass to watch fireworks  rash on face neck and chest  spreading verry itchy       Nurses Notes reviewed and I agree except as noted in the HPI. HISTORY OF PRESENT ILLNESS   Christina Pollock is a 77 y.o. female who presents with new onset rash that started on Wednesday after sitting in grass to watch fireworks. Patient reports spreading of a rash under her face, left side of the neck, and behind her leg. She complains of itching. She has tried over-the-counter hydrocortisone. The treatment was ineffective. Denies any fevers. No new soaps or detergents. The history is provided by the patient. REVIEW OF SYSTEMS     Review of Systems   Constitutional:  Negative for fever. Respiratory:  Negative for shortness of breath. Musculoskeletal:  Negative for myalgias. Skin:  Positive for rash (face, left neck, posterior left leg). Neurological:  Negative for weakness. PAST MEDICAL HISTORY         Diagnosis Date    Chronic kidney disease     Kidney stone       SURGICALHISTORY     Patient  has a past surgical history that includes Breast surgery; Tubal ligation (1983); fracture surgery; eye surgery; Cholecystectomy; bladder suspension (2011); Colonoscopy; cystoscopy w biopsy of bladder (N/A, 12/2/2019); and Cataract removal.    CURRENT MEDICATIONS       Previous Medications    CRANBERRY PO    Take by mouth    MULTIPLE VITAMIN PO    Take 0.5 tablets by mouth daily     VITAMIN D (CHOLECALCIFEROL) 1000 UNIT TABS TABLET    take 1 tablet by mouth once daily    ZINC 10 MG LOZG    Take by mouth       ALLERGIES     Patient is has No Known Allergies. Patients   There is no immunization history on file for this patient.     FAMILY HISTORY     Patient's family history includes Arrhythmia in her mother; Breast Cancer (age of onset: 46) in

## 2023-08-16 ENCOUNTER — HOSPITAL ENCOUNTER (OUTPATIENT)
Dept: INTERVENTIONAL RADIOLOGY/VASCULAR | Age: 68
Discharge: HOME | End: 2023-08-16
Payer: MEDICARE

## 2023-08-16 DIAGNOSIS — I73.9 PERIPHERAL VASCULAR DISEASE, UNSPECIFIED (HCC): ICD-10-CM

## 2023-08-16 PROCEDURE — 93925 LOWER EXTREMITY STUDY: CPT

## 2024-04-30 ENCOUNTER — HOSPITAL ENCOUNTER (OUTPATIENT)
Dept: WOMENS IMAGING | Age: 69
Discharge: HOME | End: 2024-04-30
Payer: MEDICARE

## 2024-04-30 VITALS — HEIGHT: 60 IN | WEIGHT: 101.5 LBS | BODY MASS INDEX: 19.93 KG/M2

## 2024-04-30 DIAGNOSIS — Z78.0 POST-MENOPAUSE: ICD-10-CM

## 2024-04-30 PROCEDURE — 77080 DXA BONE DENSITY AXIAL: CPT

## (undated) DEVICE — SOLUTION IRRIG 2000ML STRL H2O UROMATIC PLAS CONT USP

## (undated) DEVICE — BAG DRNGE (SEE COMMENT) UROLOGY TBL 15.5X31 IN W/HOSE

## (undated) DEVICE — SPONGE GZ W4XL4IN COT 12 PLY TYP VII WVN C FLD DSGN

## (undated) DEVICE — GLOVE SURG SZ 65 THK91MIL LTX FREE SYN POLYISOPRENE

## (undated) DEVICE — PATIENT RETURN ELECTRODE, SINGLE-USE, CONTACT QUALITY MONITORING, ADULT, WITH 9FT CORD, FOR PATIENTS WEIGING OVER 33LBS. (15KG): Brand: MEGADYNE

## (undated) DEVICE — SOLUTION SCRB 4OZ 4% CHG H2O AIDED FOR PREOPERATIVE SKIN